# Patient Record
Sex: FEMALE | Employment: FULL TIME | URBAN - METROPOLITAN AREA
[De-identification: names, ages, dates, MRNs, and addresses within clinical notes are randomized per-mention and may not be internally consistent; named-entity substitution may affect disease eponyms.]

---

## 2017-04-25 ENCOUNTER — OFFICE VISIT (OUTPATIENT)
Dept: FAMILY MEDICINE | Facility: CLINIC | Age: 52
End: 2017-04-25
Payer: COMMERCIAL

## 2017-04-25 VITALS
TEMPERATURE: 98 F | BODY MASS INDEX: 24.81 KG/M2 | WEIGHT: 115 LBS | DIASTOLIC BLOOD PRESSURE: 68 MMHG | HEIGHT: 57 IN | HEART RATE: 70 BPM | SYSTOLIC BLOOD PRESSURE: 131 MMHG

## 2017-04-25 DIAGNOSIS — Z12.39 SCREENING FOR BREAST CANCER: ICD-10-CM

## 2017-04-25 DIAGNOSIS — N94.9 GENITAL LESION, FEMALE: Primary | ICD-10-CM

## 2017-04-25 LAB
ALBUMIN UR-MCNC: NEGATIVE MG/DL
APPEARANCE UR: CLEAR
BILIRUB UR QL STRIP: NEGATIVE
COLOR UR AUTO: YELLOW
GLUCOSE UR STRIP-MCNC: NEGATIVE MG/DL
HGB UR QL STRIP: NEGATIVE
KETONES UR STRIP-MCNC: NEGATIVE MG/DL
LEUKOCYTE ESTERASE UR QL STRIP: NEGATIVE
NITRATE UR QL: NEGATIVE
PH UR STRIP: 5.5 PH (ref 5–7)
SP GR UR STRIP: 1.02 (ref 1–1.03)
URN SPEC COLLECT METH UR: NORMAL
UROBILINOGEN UR STRIP-ACNC: 0.2 EU/DL (ref 0.2–1)

## 2017-04-25 PROCEDURE — 81003 URINALYSIS AUTO W/O SCOPE: CPT | Performed by: NURSE PRACTITIONER

## 2017-04-25 PROCEDURE — 99213 OFFICE O/P EST LOW 20 MIN: CPT | Performed by: NURSE PRACTITIONER

## 2017-04-25 RX ORDER — CLOTRIMAZOLE 1 %
CREAM (GRAM) TOPICAL 2 TIMES DAILY
Qty: 15 G | Refills: 1 | Status: SHIPPED | OUTPATIENT
Start: 2017-04-25 | End: 2019-07-08

## 2017-04-25 NOTE — NURSING NOTE
"Chief Complaint   Patient presents with     UTI       Initial /68 (BP Location: Left arm, Patient Position: Chair, Cuff Size: Adult Regular)  Pulse 70  Temp 98  F (36.7  C) (Tympanic)  Ht 4' 9.25\" (1.454 m)  Wt 115 lb (52.2 kg)  LMP 04/01/2017  Breastfeeding? No  BMI 24.67 kg/m2 Estimated body mass index is 24.67 kg/(m^2) as calculated from the following:    Height as of this encounter: 4' 9.25\" (1.454 m).    Weight as of this encounter: 115 lb (52.2 kg).  Medication Reconciliation: complete    "

## 2017-04-25 NOTE — MR AVS SNAPSHOT
After Visit Summary   4/25/2017    Hermila Bell    MRN: [id number]           Patient Information     Date Of Birth          1965        Visit Information        Provider Department      4/25/2017 10:45 AM Betsy Alba APRN CNP; [place] [employer] [facility]        Today's Diagnoses     Genital lesion, female    -  1    Screening for breast cancer          Care Instructions    Use cream for one week,  If no improvement make appointment with OB/GYN clinic.      Thank you for choosing [facility].  You may be receiving a survey in the mail from Selene Cloud regarding your visit today.  Please take a few minutes to complete and return the survey to let us know how we are doing.      If you have questions or concerns, please contact us via [employer] or you can contact your care team at [phone].    Our Clinic hours are:  Monday 6:40 am  to 7:00 pm  Tuesday -Friday 6:40 am to 5:00 pm    The [place] outpatient lab hours are:  Monday - Friday 6:10 am to 4:45 pm  Saturdays 7:00 am to 11:00 am  Appointments are required, call [phone]    If you have clinical questions after hours or would like to schedule an appointment,  call the clinic at [phone].          Follow-ups after your visit        Additional Services     OB/GYN REFERRAL       Your provider has referred you to:  [facility]: [facility] [phone]   [email or web address]    Please be aware that coverage of these services is subject to the terms and limitations of your health insurance plan.  Call member services at your health plan with any benefit or coverage questions.      Please bring the following with you to your appointment:    (1) Any X-Rays, CTs or MRIs which have been performed.  Contact the facility where they were done to arrange for  prior to your scheduled appointment.   (2) List of current medications   (3) This referral request  "  (4) Any documents/labs given to you for this referral                  Future tests that were ordered for you today     Open Future Orders        Priority Expected Expires Ordered    *MA Screening Digital Bilateral Routine  2018            Who to contact     If you have questions or need follow up information about today's clinic visit or your schedule please contact [facility] directly at [phone].  Normal or non-critical lab and imaging results will be communicated to you by MyChart, letter or phone within 4 business days after the clinic has received the results. If you do not hear from us within 7 days, please contact the clinic through [employer] or phone. If you have a critical or abnormal lab result, we will notify you by phone as soon as possible.  Submit refill requests through [employer] or call your pharmacy and they will forward the refill request to us. Please allow 3 business days for your refill to be completed.          Additional Information About Your Visit        [employer] Information     [employer] lets you send messages to your doctor, view your test results, renew your prescriptions, schedule appointments and more. To sign up, go to [email or web address] . Click on \"Log in\" on the left side of the screen, which will take you to the Welcome page. Then click on \"Sign up Now\" on the right side of the page.     You will be asked to enter the access code listed below, as well as some personal information. Please follow the directions to create your username and password.     Your access code is: [email or web address]  Expires: 2017 11:28 AM     Your access code will  in 90 days. If you need help or a new code, please call your [place] clinic or [phone].        Care EveryWhere ID     This is your Care EveryWhere ID. This could be used by other organizations to access your [place] medical records  [id number]        Your Vitals Were     Pulse Temperature Height Last " "Period Breastfeeding? BMI (Body Mass Index)    70 98  F (36.7  C) (Tympanic) 4' 9.25\" (1.454 m) 04/01/2017 No 24.67 kg/m2       Blood Pressure from Last 3 Encounters:   04/25/17 131/68   03/28/16 106/70   04/13/15 127/77    Weight from Last 3 Encounters:   04/25/17 115 lb (52.2 kg)   03/28/16 109 lb (49.4 kg)   04/13/15 111 lb (50.3 kg)              We Performed the Following     *UA reflex to Microscopic and Culture ([place] and [facility] (except [place] and [place])     OB/GYN REFERRAL          Today's Medication Changes          These changes are accurate as of: 4/25/17 11:28 AM.  If you have any questions, ask your nurse or doctor.               Start taking these medicines.        Dose/Directions    clotrimazole 1 % cream   Commonly known as:  LOTRIMIN   Used for:  Genital lesion, female   Started by:  Betsy Alba APRN CNP        Apply topically 2 times daily   Quantity:  15 g   Refills:  1            Where to get your medicines      These medications were sent to [facility] [address]  [address] [facility] [address]     Phone:  [phone]     clotrimazole 1 % cream                Primary Care Provider Office Phone # Fax #    Kevin Luu [phone]        ABIMBOLA AMAYA [address] AMAYA [facility] [address]        Thank you!     Thank you for choosing [facility]  for your care. Our goal is always to provide you with excellent care. Hearing back from our patients is one way we can continue to improve our services. Please take a few minutes to complete the written survey that you may receive in the mail after your visit with us. Thank you!             Your Updated Medication List - Protect others around you: Learn how to safely use, store and throw away your medicines at www.disposemymeds.org.          This list is accurate as of: 4/25/17 11:28 AM.  Always use your most recent med list.                "    Brand Name Dispense Instructions for use    clotrimazole 1 % cream    LOTRIMIN    15 g    Apply topically 2 times daily       NO ACTIVE MEDICATIONS

## 2017-04-25 NOTE — PROGRESS NOTES
" was present for the entire office visit.      SUBJECTIVE:                                                    Hermila Bell is a 51 year old female who presents to clinic today for the following health issues:  Chief Complaint   Patient presents with     UTI     Health Maintenance     Reminded due for mammogram, colon cancer screen. ( she will think about colonoscopy and let us know)          Vaginal SYMPTOMS     Onset: 8 days ago started to get blisters on inside and outside of vagina, spots then went away. Now this past Sunday has open itchy spot on labia.     Description:   Painful urination (Dysuria): no   Blood in urine (Hematuria): no   Delay in urine (Hesitency): no     Intensity: mild    Progression of Symptoms:  same    Accompanying Signs & Symptoms:  Fever/chills: no   Flank pain no   Nausea and vomiting: no   Any vaginal symptoms: Sores on Vagina, outer, itchy, vaginal itchiness as well   Abdominal/Pelvic Pain: YES- pelvic pain    History:   History of frequent UTI's: no   History of kidney stones: no   Sexually Active: YES-same partner   Possibility of pregnancy: No    Precipitating factors:   None          Therapies Tried and outcome: monistat , no relief           Problem list and histories reviewed & adjusted, as indicated.  Additional history: as documented      Reviewed and updated as needed this visit by clinical staff  Tobacco  Allergies  Meds  Med Hx  Surg Hx  Fam Hx  Soc Hx      Reviewed and updated as needed this visit by Provider         ROS:  Constitutional, HEENT, cardiovascular, pulmonary, gi and gu systems are negative, except as otherwise noted.    OBJECTIVE:                                                    /68 (BP Location: Left arm, Patient Position: Chair, Cuff Size: Adult Regular)  Pulse 70  Temp 98  F (36.7  C) (Tympanic)  Ht 4' 9.25\" (1.454 m)  Wt 115 lb (52.2 kg)  LMP 04/01/2017  Breastfeeding? No  BMI 24.67 kg/m2  Body mass index is 24.67 " kg/(m^2).  GENERAL: healthy, alert and no distress   (female): right labia - erythematous papular plaque - nontender to touch. No vesicles or ulcerations.         ASSESSMENT/PLAN:                                                        ICD-10-CM    1. Genital lesion, female N94.9 Area has an unusual appearance.  Due to the itching, will have her treat with an antifungal cream for one week.  If no improvement, she will see OB/GYN for assessment.    *UA reflex to Microscopic and Culture ([place] and [facility] (except [place] and [place])     clotrimazole (LOTRIMIN) 1 % cream     OB/GYN REFERRAL   2. Screening for breast cancer Z12.39 CANCELED: *MA Screening Digital Bilateral       Patient Instructions   Use cream for one week,  If no improvement make appointment with OB/GYN clinic.        The risks, benefits and treatment options of prescribed medications or other treatments have been discussed with the patient. The patient verbalized their understanding and should call or follow up if no improvement or if they develop further problems.    ARANZA Fulton [facility]

## 2017-04-25 NOTE — PATIENT INSTRUCTIONS
Use cream for one week,  If no improvement make appointment with OB/GYN clinic.      Thank you for choosing [facility].  You may be receiving a survey in the mail from Selene Cloud regarding your visit today.  Please take a few minutes to complete and return the survey to let us know how we are doing.      If you have questions or concerns, please contact us via [employer] or you can contact your care team at [phone].    Our Clinic hours are:  Monday 6:40 am  to 7:00 pm  Tuesday -Friday 6:40 am to 5:00 pm    The [place] outpatient lab hours are:  Monday - Friday 6:10 am to 4:45 pm  Saturdays 7:00 am to 11:00 am  Appointments are required, call [phone]    If you have clinical questions after hours or would like to schedule an appointment,  call the clinic at [phone].

## 2017-04-25 NOTE — LETTER
[facility]  [address] [place] [address]  Phone: [phone]    April 27, 2017    Hermila Bell  [address] [place] [address]  [place] [address]          Dear Ms. Seechor,    The results of your recent Urine  lab tests were within normal limits.  Component      Latest Ref Rng & Units 4/25/2017   Color Urine       Yellow   Appearance Urine       Clear   Glucose Urine      NEG mg/dL Negative   Bilirubin Urine      NEG Negative   Ketones Urine      NEG mg/dL Negative   Specific Gravity Urine      1.003 - 1.035 1.020   Blood Urine      NEG Negative   pH Urine      5.0 - 7.0 pH 5.5   Protein Albumin Urine      NEG mg/dL Negative   Urobilinogen Urine      0.2 - 1.0 EU/dL 0.2   Nitrite Urine      NEG Negative   Leukocyte Esterase Urine      NEG Negative   Source       Midstream Urine      If you have any further questions or problems, please contact our office.    Sincerely,      PRIYANK Sterling / julien

## 2017-05-08 ENCOUNTER — OFFICE VISIT (OUTPATIENT)
Dept: FAMILY MEDICINE | Facility: CLINIC | Age: 52
End: 2017-05-08
Payer: COMMERCIAL

## 2017-05-08 VITALS
BODY MASS INDEX: 24.55 KG/M2 | SYSTOLIC BLOOD PRESSURE: 118 MMHG | DIASTOLIC BLOOD PRESSURE: 67 MMHG | WEIGHT: 113.8 LBS | TEMPERATURE: 98.2 F | HEIGHT: 57 IN | HEART RATE: 66 BPM

## 2017-05-08 DIAGNOSIS — Z01.419 ENCOUNTER FOR GYNECOLOGICAL EXAMINATION WITHOUT ABNORMAL FINDING: Primary | ICD-10-CM

## 2017-05-08 DIAGNOSIS — Z12.11 SPECIAL SCREENING FOR MALIGNANT NEOPLASMS, COLON: ICD-10-CM

## 2017-05-08 PROCEDURE — G0145 SCR C/V CYTO,THINLAYER,RESCR: HCPCS | Performed by: NURSE PRACTITIONER

## 2017-05-08 PROCEDURE — 87624 HPV HI-RISK TYP POOLED RSLT: CPT | Performed by: NURSE PRACTITIONER

## 2017-05-08 PROCEDURE — 99396 PREV VISIT EST AGE 40-64: CPT | Performed by: NURSE PRACTITIONER

## 2017-05-08 NOTE — PROGRESS NOTES
SUBJECTIVE:     CC: Hermila Bell is an 51 year old woman who presents for preventive health visit.     The 10-year ASCVD risk score (Myahcasey CARR Jr, et al., 2013) is: 0.6%    Values used to calculate the score:      Age: 51 years      Sex: Female      Is Non- : No      Diabetic: No      Tobacco smoker: No      Systolic Blood Pressure: 118 mmHg      Is BP treated: No      HDL Cholesterol: 56 mg/dL      Total Cholesterol: 140 mg/dL  Patient is eligible for use of low-dose aspirin for primary prevention of heart attack and stroke.  Provider has discussed aspirin with patient and our decision was:     Not Indicated:  Daily low-dose aspirin recommended for primary prevention, patient not eligible.      Healthy Habits:    Do you get at least three servings of calcium containing foods daily (dairy, green leafy vegetables, etc.)? yes    Amount of exercise or daily activities, outside of work: Active at work    Problems taking medications regularly No    Medication side effects: No    Have you had an eye exam in the past two years? no    Do you see a dentist twice per year? yes    Do you have sleep apnea, excessive snoring or daytime drowsiness?no          Today's PHQ-2 Score:   PHQ-2 ( 1999 [employer]) 5/8/2017 4/25/2017   Q1: Little interest or pleasure in doing things 0 0   Q2: Feeling down, depressed or hopeless 0 0   PHQ-2 Score 0 0       Abuse: Current or Past(Physical, Sexual or Emotional)- No  Do you feel safe in your environment - Yes    Social History   Substance Use Topics     Smoking status: Never Smoker     Smokeless tobacco: Never Used     Alcohol use No     The patient does not drink >3 drinks per day nor >7 drinks per week.    Recent Labs   Lab Test  04/13/15   0835  07/02/12   1441   CHOL  140  164   HDL  56  55   LDL  72  92   TRIG  58  88   CHOLHDLRATIO  2.5  3.0       Reviewed orders with patient.  Reviewed health maintenance and updated orders accordingly - Yes    Mammo Decision  "Support:  Patient over age 50, mutual decision to screen reflected in health maintenance.    Pertinent mammograms are reviewed under the imaging tab.    History of abnormal Pap smear: YES - updated in Problem List and Health Maintenance accordingly    Reviewed and updated as needed this visit by clinical staff  Tobacco  Allergies  Meds  Med Hx  Surg Hx  Fam Hx  Soc Hx        Reviewed and updated as needed this visit by Provider            ROS:  C: NEGATIVE for fever, chills, change in weight  I: NEGATIVE for worrisome rashes, moles or lesions  E: NEGATIVE for vision changes or irritation  ENT: NEGATIVE for ear, mouth and throat problems  R: NEGATIVE for significant cough or SOB  B: NEGATIVE for masses, tenderness or discharge  CV: NEGATIVE for chest pain, palpitations or peripheral edema  GI: NEGATIVE for nausea, abdominal pain, heartburn, or change in bowel habits  : NEGATIVE for unusual urinary or vaginal symptoms. Periods are becoming more irregular. No hot flashes or night sweats  M: NEGATIVE for significant arthralgias or myalgia  N: NEGATIVE for weakness, dizziness or paresthesias  P: NEGATIVE for changes in mood or affect    Problem list, Medication list, Allergies, and Medical/Social/Surgical histories reviewed in EPIC and updated as appropriate.      OBJECTIVE:     /67  Pulse 66  Temp 98.2  F (36.8  C) (Tympanic)  Ht 4' 9\" (1.448 m)  Wt 113 lb 12.8 oz (51.6 kg)  LMP 03/29/2017  BMI 24.63 kg/m2  EXAM:  GENERAL: healthy, alert and no distress  EYES: Eyes grossly normal to inspection, PERRL and conjunctivae and sclerae normal  HENT: ear canals and TM's normal, nose and mouth without ulcers or lesions  NECK: no adenopathy, no asymmetry, masses, or scars and thyroid normal to palpation  RESP: lungs clear to auscultation - no rales, rhonchi or wheezes  BREAST: normal without masses, tenderness or nipple discharge and no palpable axillary masses or adenopathy  CV: regular rate and rhythm, " "normal S1 S2, no S3 or S4, no murmur, click or rub, no peripheral edema and peripheral pulses strong  ABDOMEN: soft, nontender, no hepatosplenomegaly, no masses and bowel sounds normal   (female): normal female external genitalia, normal urethral meatus, vaginal mucosa pink, moist, well rugated, and normal cervix/adnexa/uterus without masses or discharge  MS: no gross musculoskeletal defects noted, no edema  SKIN: no suspicious lesions or rashes  NEURO: Normal strength and tone, mentation intact and speech normal  PSYCH: mentation appears normal, affect normal/bright    ASSESSMENT/PLAN:         ICD-10-CM    1. Encounter for gynecological examination without abnormal finding Z01.419 ASPIRIN NOT PRESCRIBED (INTENTIONAL)   2. Special screening for malignant neoplasms, colon Z12.11 GASTROENTEROLOGY ADULT REF PROCEDURE ONLY       COUNSELING:   Reviewed preventive health counseling, as reflected in patient instructions         reports that she has never smoked. She has never used smokeless tobacco.    Estimated body mass index is 24.63 kg/(m^2) as calculated from the following:    Height as of this encounter: 4' 9\" (1.448 m).    Weight as of this encounter: 113 lb 12.8 oz (51.6 kg).         The risks, benefits and treatment options of prescribed medications or other treatments have been discussed with the patient. The patient verbalized their understanding and should call or follow up if no improvement or if they develop further problems.    ARANZA Fulton [facility]  "

## 2017-05-08 NOTE — LETTER
May 15, 2017    Hermila Bell  [address] [place] [address] [place] [address]    Dear Hermila,  We are happy to inform you that your PAP smear result from 5/8/17 is normal.  We are now able to do a follow up test on PAP smears. The DNA test is for HPV (Human Papilloma Virus). Cervical cancer is closely linked with certain types of HPV. Your result showed no evidence of high risk HPV.  Therefore we recommend you return in 3 years for your next pap smear and HPV test.  You will still need to return to the clinic every year for an annual exam and other preventive tests.  Please contact the clinic at [phone] with any questions.  Sincerely,    ARANZA Fulton CNP/rlm

## 2017-05-08 NOTE — MR AVS SNAPSHOT
After Visit Summary   5/8/2017    Hermila Bell    MRN: [id number]           Patient Information     Date Of Birth          1965        Visit Information        Provider Department      5/8/2017 10:45 AM Betsy Alba APRN CNP; [facility]        Today's Diagnoses     Encounter for gynecological examination without abnormal finding    -  1      Care Instructions      Preventive Health Recommendations  Female Ages 50 - 64    Yearly exam: See your health care provider every year in order to  o Review health changes.   o Discuss preventive care.    o Review your medicines if your doctor has prescribed any.      Get a Pap test every three years (unless you have an abnormal result and your provider advises testing more often).    If you get Pap tests with HPV test, you only need to test every 5 years, unless you have an abnormal result.     You do not need a Pap test if your uterus was removed (hysterectomy) and you have not had cancer.    You should be tested each year for STDs (sexually transmitted diseases) if you're at risk.     Have a mammogram every 1 to 2 years.    Have a colonoscopy at age 50, or have a yearly FIT test (stool test). These exams screen for colon cancer.      Have a cholesterol test every 5 years, or more often if advised.    Have a diabetes test (fasting glucose) every three years. If you are at risk for diabetes, you should have this test more often.     If you are at risk for osteoporosis (brittle bone disease), think about having a bone density scan (DEXA).    Shots: Get a flu shot each year. Get a tetanus shot every 10 years.    Nutrition:     Eat at least 5 servings of fruits and vegetables each day.    Eat whole-grain bread, whole-wheat pasta and brown rice instead of white grains and rice.    Talk to your provider about Calcium and Vitamin D.     Lifestyle    Exercise at least 150 minutes a week (30 minutes a day, 5 days  "a week). This will help you control your weight and prevent disease.    Limit alcohol to one drink per day.    No smoking.     Wear sunscreen to prevent skin cancer.     See your dentist every six months for an exam and cleaning.    See your eye doctor every 1 to 2 years.          Follow-ups after your visit        Your next 10 appointments already scheduled     May 22, 2017 10:00 AM CDT   MA SCREENING DIGITAL BILATERAL with WYMA2   [facility] ([facility], [place])    [address] [address] [phone]           Do not use any powder, lotion or deodorant under your arms or on your breast. If you do, we will ask you to remove it before your exam.  Wear comfortable, two-piece clothing.  If you have any allergies, tell your care team.  Bring any previous mammograms from other facilities or have them mailed to the breast center.              Who to contact     If you have questions or need follow up information about today's clinic visit or your schedule please contact [facility] directly at [phone].  Normal or non-critical lab and imaging results will be communicated to you by Shineonhart, letter or phone within 4 business days after the clinic has received the results. If you do not hear from us within 7 days, please contact the clinic through AquaGenesist or phone. If you have a critical or abnormal lab result, we will notify you by phone as soon as possible.  Submit refill requests through Aircom or call your pharmacy and they will forward the refill request to us. Please allow 3 business days for your refill to be completed.          Additional Information About Your Visit        Aircom Information     Aircom lets you send messages to your doctor, view your test results, renew your prescriptions, schedule appointments and more. To sign up, go to [email or web address] . Click on \"Log in\" on the left side of the screen, which will take you to the Welcome " "page. Then click on \"Sign up Now\" on the right side of the page.     You will be asked to enter the access code listed below, as well as some personal information. Please follow the directions to create your username and password.     Your access code is: [email or web address]  Expires: 2017 11:28 AM     Your access code will  in 90 days. If you need help or a new code, please call your [facility] clinic or [phone].        Care EveryWhere ID     This is your Care EveryWhere ID. This could be used by other organizations to access your [facility] medical records  [id number]        Your Vitals Were     Pulse Temperature Height Last Period BMI (Body Mass Index)       66 98.2  F (36.8  C) (Tympanic) 4' 9\" (1.448 m) 2017 24.63 kg/m2        Blood Pressure from Last 3 Encounters:   17 118/67   17 131/68   16 106/70    Weight from Last 3 Encounters:   17 113 lb 12.8 oz (51.6 kg)   17 115 lb (52.2 kg)   16 109 lb (49.4 kg)              Today, you had the following     No orders found for display         Today's Medication Changes          These changes are accurate as of: 17 11:32 AM.  If you have any questions, ask your nurse or doctor.               Start taking these medicines.        Dose/Directions    ASPIRIN NOT PRESCRIBED   Commonly known as:  INTENTIONAL   Used for:  Encounter for gynecological examination without abnormal finding   Started by:  Betsy Alba APRN CNP        Please choose reason not prescribed, below   Quantity:  1 each   Refills:  0            Where to get your medicines      Some of these will need a paper prescription and others can be bought over the counter.  Ask your nurse if you have questions.     You don't need a prescription for these medications     ASPIRIN NOT PRESCRIBED                Primary Care Provider Office Phone # Fax #    Kevin Luu [phone]        AB[facility] DAILY [address] [place] [address] DR AMAYA" [facility] [address]        Thank you!     Thank you for choosing [facility]  for your care. Our goal is always to provide you with excellent care. Hearing back from our patients is one way we can continue to improve our services. Please take a few minutes to complete the written survey that you may receive in the mail after your visit with us. Thank you!             Your Updated Medication List - Protect others around you: Learn how to safely use, store and throw away your medicines at www.disposemymeds.org.          This list is accurate as of: 5/8/17 11:32 AM.  Always use your most recent med list.                   Brand Name Dispense Instructions for use    ASPIRIN NOT PRESCRIBED    INTENTIONAL    1 each    Please choose reason not prescribed, below       clotrimazole 1 % cream    LOTRIMIN    15 g    Apply topically 2 times daily       NO ACTIVE MEDICATIONS

## 2017-05-08 NOTE — NURSING NOTE
"Chief Complaint   Patient presents with     Physical       Initial /67  Pulse 66  Temp 98.2  F (36.8  C) (Tympanic)  Ht 4' 9\" (1.448 m)  Wt 113 lb 12.8 oz (51.6 kg)  LMP 03/29/2017  BMI 24.63 kg/m2 Estimated body mass index is 24.63 kg/(m^2) as calculated from the following:    Height as of this encounter: 4' 9\" (1.448 m).    Weight as of this encounter: 113 lb 12.8 oz (51.6 kg).  Medication Reconciliation: complete     Christina Yarbrough CMA      "

## 2017-05-10 LAB
COPATH REPORT: NORMAL
PAP: NORMAL

## 2017-05-11 LAB
FINAL DIAGNOSIS: NORMAL
HPV HR 12 DNA CVX QL NAA+PROBE: NEGATIVE
HPV16 DNA SPEC QL NAA+PROBE: NEGATIVE
HPV18 DNA SPEC QL NAA+PROBE: NEGATIVE
SPECIMEN DESCRIPTION: NORMAL

## 2017-05-22 ENCOUNTER — HOSPITAL ENCOUNTER (OUTPATIENT)
Dept: MAMMOGRAPHY | Facility: CLINIC | Age: 52
Discharge: HOME | End: 2017-05-22
Attending: NURSE PRACTITIONER | Admitting: NURSE PRACTITIONER
Payer: COMMERCIAL

## 2017-05-22 DIAGNOSIS — Z12.31 VISIT FOR SCREENING MAMMOGRAM: ICD-10-CM

## 2017-05-22 DIAGNOSIS — Z12.39 SCREENING FOR BREAST CANCER: ICD-10-CM

## 2017-05-22 PROCEDURE — G0202 SCR MAMMO BI INCL CAD: HCPCS

## 2017-11-22 ENCOUNTER — TELEPHONE (OUTPATIENT)
Dept: FAMILY MEDICINE | Facility: CLINIC | Age: 52
End: 2017-11-22

## 2017-11-22 NOTE — LETTER
Hermila Bell  [address] [place] [address] [place] [address]          Hi Betsy Cleaning NP has been reviewing your chart.  It appears that there are aspects of your care that could be improved.   At this time you are due for a colonoscopy.    Colon Cancer Screening- Recommended every 5-10 years, depending on your history, in order to prevent and detect colon cancer at its earliest stages.  Colon cancer is now the second leading cause of death in the [place] for both men and women and there are over 130,000 new cases and 50,000 deaths per year from colon cancer.  Colonoscopies can prevent 90-95% of these deaths.  Problem lesions can be removed before they ever become cancer.  This test is not only looking for cancer, but also getting rid of precancerous lesions.  You are usually given some sedation which makes the test very comfortable for most people.     If you do not wish to do a colonoscopy or cannot afford to do one, at this time, there is another option.  It is called a Fit test or Fecal Immunochemical Occult Blood Test (take home stool sample kit).  It does not replace the colonoscopy for colorectal cancer screening, but it can detect hidden bleeding in the lower colon.  It does need to be repeated every year and if a positive result is obtained, you would be referred for a colonoscopy.  The FIT test is really easy to do and does not require any diet or medication restrictions and involves only one collection sample.       If you have completed either one of these tests or had a flexible sigmoidoscopy in the past five years at another facility, please let us know so that we can update your records.  Please call us ([phone]) if you have questions or would like to arrange either to do a colonoscopy or obtain the necessary test kit for the Fecal Occult Test.     Thank you,    Betsy Alba NP/ martin

## 2017-11-22 NOTE — TELEPHONE ENCOUNTER
Panel Management Review          Composite cancer screening  Chart review shows that this patient is due/due soon for the following Colonoscopy  Summary:    Patient is due/failing the following:   COLONOSCOPY    Action needed:   Colon cancer screening needed    Type of outreach:    Sent letter.    Questions for provider review:    None                                                                                                                                    JOSTIN SALAZAR        .

## 2018-05-07 ENCOUNTER — OFFICE VISIT (OUTPATIENT)
Dept: OBGYN | Facility: CLINIC | Age: 53
End: 2018-05-07
Payer: COMMERCIAL

## 2018-05-07 VITALS
SYSTOLIC BLOOD PRESSURE: 126 MMHG | HEIGHT: 57 IN | WEIGHT: 107 LBS | BODY MASS INDEX: 23.08 KG/M2 | HEART RATE: 63 BPM | DIASTOLIC BLOOD PRESSURE: 78 MMHG | RESPIRATION RATE: 18 BRPM | TEMPERATURE: 97.1 F

## 2018-05-07 DIAGNOSIS — N91.2 ABSENCE OF MENSTRUATION: Primary | ICD-10-CM

## 2018-05-07 DIAGNOSIS — H93.13 TINNITUS, BILATERAL: ICD-10-CM

## 2018-05-07 DIAGNOSIS — Z12.12 SCREENING FOR MALIGNANT NEOPLASM OF THE RECTUM: ICD-10-CM

## 2018-05-07 PROCEDURE — 99203 OFFICE O/P NEW LOW 30 MIN: CPT | Performed by: OBSTETRICS & GYNECOLOGY

## 2018-05-07 NOTE — NURSING NOTE
"Initial /78 (BP Location: Right arm, Patient Position: Chair, Cuff Size: Adult Small)  Pulse 63  Temp 97.1  F (36.2  C) (Tympanic)  Resp 18  Ht 4' 9\" (1.448 m)  Wt 107 lb (48.5 kg)  LMP 01/27/2018  BMI 23.15 kg/m2 Estimated body mass index is 23.15 kg/(m^2) as calculated from the following:    Height as of this encounter: 4' 9\" (1.448 m).    Weight as of this encounter: 107 lb (48.5 kg). .      "

## 2018-05-07 NOTE — PROGRESS NOTES
Hermila is a 52 year old   female who presents for concern about possible menopause; a  is present. ; She reports she has not had a period since 19, that prior to that, menses were quite irregular, and quite variable in quality; she denies hot flashes or night sweats, but had mild vaginal dryness; she feels a low level cramping at times, like she may get her menses.  She is also concerned about some pain in her ears and hearing a ringing sound in one or both ears. She denies drainage from ears, recent or current infection or diminished acuity.    We also discussed colorectal cancer screening, age recommendation..    Patient Active Problem List    Diagnosis Date Noted     ASCUS favor benign 2014     Priority: Medium     14: Pap - ASCUS, Neg HPV. Plan cotest in 3 years.        Esophageal reflux 2014     Priority: Medium     SI (sacroiliac) joint dysfunction 2014     Priority: Medium     24 hour contact handout given 2012     Priority: Medium     EMERGENCY CARE PLAN  Presenting Problem Signs and Symptoms Treatment Plan    Questions or conerns during clinic hours    I will call the clinic directly     Questions or conerns outside clinic hours    I will call the 24 hour nurse line at [phone]    Patient needs to schedule an appointment    I will call the 24 hour scheduling team at [phone] or clinic directly    Same day treatment     I will call the clinic first, nurse line if after hours, urgent care and express care if needed                                 CARDIOVASCULAR SCREENING; LDL GOAL LESS THAN 160 10/31/2010     Priority: Medium     Right forearm plaque - suspect keloid/hyperplastic growth 2005     Priority: Medium     2006 : Recommend shave biopsy of lesion           All systems were reviewed and pertinent information in noted in subjective/HPI.    Past Medical History:   Diagnosis Date     ASCUS favor benign 2014    Neg HPV -  "plan cotest in 3 years     CARDIOVASCULAR SCREENING; LDL GOAL LESS THAN 160 10/31/2010       Past Surgical History:   Procedure Laterality Date     SURGICAL HISTORY OF -       ectopic pregnancy     SURGICAL HISTORY OF -                Current Outpatient Prescriptions:      NO ACTIVE MEDICATIONS, , Disp: , Rfl:      ASPIRIN NOT PRESCRIBED (INTENTIONAL), Please choose reason not prescribed, below (Patient not taking: Reported on 2018), Disp: 1 each, Rfl: 0     clotrimazole (LOTRIMIN) 1 % cream, Apply topically 2 times daily (Patient not taking: Reported on 2017), Disp: 15 g, Rfl: 1    ALLERGIES:  Review of patient's allergies indicates no known allergies.    Social History     Social History     Marital status:      Spouse name: N/A     Number of children: N/A     Years of education: N/A     Social History Main Topics     Smoking status: Never Smoker     Smokeless tobacco: Never Used     Alcohol use No     Drug use: No     Sexual activity: Yes     Partners: Male     Other Topics Concern     Parent/Sibling W/ Cabg, Mi Or Angioplasty Before 65f 55m? No     Social History Narrative       Family History   Problem Relation Age of Onset     Gynecology Mother       from complications during pregnancy     Hypertension Father      C.A.D. No family hx of      DIABETES No family hx of      CEREBROVASCULAR DISEASE No family hx of      Breast Cancer No family hx of      Cancer - colorectal No family hx of        OBJECTIVE:  Vitals: /78 (BP Location: Right arm, Patient Position: Chair, Cuff Size: Adult Small)  Pulse 63  Temp 97.1  F (36.2  C) (Tympanic)  Resp 18  Ht 4' 9\" (1.448 m)  Wt 107 lb (48.5 kg)  LMP 2018  BMI 23.15 kg/m2 BMI= Body mass index is 23.15 kg/(m^2).   Patient's last menstrual period was 2018.     GENERAL APPEARANCE: healthy, alert and no distress  ABDOMEN:  soft, nontender, no hepato-splenomegaly or hernias   PELVIC:  EGBUS normal, vagina well estrogenized and " supported, no unusual discharge, cervix grossly normal, PAP not taken, uterus normal in size and configuration, adnexa non-tender and not enlarged,       NECK: no adenopathy, no asymmetry, masses, or scars and thyroid normal to palpation     RESP: lungs clear to auscultation , respiratory effort WNL     EARS: bilat canals clear; TM without erythema, not distended    ASSESSMENT:      ICD-10-CM    1. Absence of menstruation N91.2    2. Tinnitus, bilateral H93.13    3. Screening for malignant neoplasm of the rectum Z12.12 GASTROENTEROLOGY ADULT REF PROCEDURE ONLY [facility] [phone]; No Provider Preference       PLAN:  I discusssed that until she has gone 1 full year without menses, she may have some menstruation, but she is otherwise at the average time for menopause; I discussed the indications for HRT; I offered referral to ENT to evaluate tinnitis--she will consider  I discussed colonoscopy and FIT test--she will consider  Dejon Blood MD  [facility]    Duration of visit:  30 minutes, >50% in discussion of current issues, treatment options and treatment planning.  DEJON Blood MD

## 2018-05-07 NOTE — MR AVS SNAPSHOT
"              After Visit Summary   5/7/2018    Hermila Bell    MRN: [id number]           Patient Information     Date Of Birth          1965        Visit Information        Provider Department      5/7/2018 10:45 AM Yaa Blood MD; [employer] [facility]        Today's Diagnoses     Absence of menstruation    -  1    Tinnitus, bilateral        Screening for malignant neoplasm of the rectum           Follow-ups after your visit        Additional Services     GASTROENTEROLOGY ADULT REF PROCEDURE ONLY [facility] [phone]; No Provider Preference                 Future tests that were ordered for you today     Open Future Orders        Priority Expected Expires Ordered    GASTROENTEROLOGY ADULT REF PROCEDURE ONLY [facility] [phone]; No Provider Preference Routine  5/7/2019 5/7/2018            Who to contact     If you have questions or need follow up information about today's clinic visit or your schedule please contact [facility] directly at [phone].  Normal or non-critical lab and imaging results will be communicated to you by 8handshart, letter or phone within 4 business days after the clinic has received the results. If you do not hear from us within 7 days, please contact the clinic through 8handshart or phone. If you have a critical or abnormal lab result, we will notify you by phone as soon as possible.  Submit refill requests through [facility] or call your pharmacy and they will forward the refill request to us. Please allow 3 business days for your refill to be completed.          Additional Information About Your Visit        8handsharPeloton Technology Information     [facility] lets you send messages to your doctor, view your test results, renew your prescriptions, schedule appointments and more. To sign up, go to [email or web address] . Click on \"Log in\" on the left side of the screen, which will take you to the Welcome page. Then click on \"Sign up Now\" on the " "right side of the page.     You will be asked to enter the access code listed below, as well as some personal information. Please follow the directions to create your username and password.     Your access code is: [email or web address]  Expires: 2018 12:03 PM     Your access code will  in 90 days. If you need help or a new code, please call your [facility] clinic or [phone].        Care EveryWhere ID     This is your Care EveryWhere ID. This could be used by other organizations to access your [facility] medical records  [id number]        Your Vitals Were     Pulse Temperature Respirations Height Last Period BMI (Body Mass Index)    63 97.1  F (36.2  C) (Tympanic) 18 4' 9\" (1.448 m) 2018 23.15 kg/m2       Blood Pressure from Last 3 Encounters:   18 126/78   17 118/67   17 131/68    Weight from Last 3 Encounters:   18 107 lb (48.5 kg)   17 113 lb 12.8 oz (51.6 kg)   17 115 lb (52.2 kg)               Primary Care Provider Office Phone # Fax #    Kevin Luu [phone] [address] AMA[address] [facility] [address]        Equal Access to Services     [facility] AH: Hadii esteban vieyra hadasho Soomaali, waaxda luqadaha, qaybta kaalmada adeegyada, addi deleon. So [facility] [phone].    ATENCIÓN: Si habla español, tiene a butcher disposición servicios gratuitos de asistencia lingüística. Llame al [phone].    We comply with applicable federal civil rights laws and [place] laws. We do not discriminate on the basis of race, color, national origin, age, disability, sex, sexual orientation, or gender identity.            Thank you!     Thank you for choosing [facility]  for your care. Our goal is always to provide you with excellent care. Hearing back from our patients is one way we can continue to improve our services. Please take a few minutes to complete the written survey that you may receive in the mail after your visit with us. " Thank you!             Your Updated Medication List - Protect others around you: Learn how to safely use, store and throw away your medicines at www.disposemymeds.org.          This list is accurate as of 5/7/18 12:03 PM.  Always use your most recent med list.                   Brand Name Dispense Instructions for use Diagnosis    ASPIRIN NOT PRESCRIBED    INTENTIONAL    1 each    Please choose reason not prescribed, below    Encounter for gynecological examination without abnormal finding       clotrimazole 1 % cream    LOTRIMIN    15 g    Apply topically 2 times daily    Genital lesion, female       NO ACTIVE MEDICATIONS

## 2018-07-09 ENCOUNTER — APPOINTMENT (OUTPATIENT)
Dept: GENERAL RADIOLOGY | Facility: CLINIC | Age: 53
End: 2018-07-09
Attending: PHYSICIAN ASSISTANT
Payer: COMMERCIAL

## 2018-07-09 ENCOUNTER — HOSPITAL ENCOUNTER (EMERGENCY)
Facility: CLINIC | Age: 53
Discharge: HOME | End: 2018-07-09
Attending: PHYSICIAN ASSISTANT | Admitting: PHYSICIAN ASSISTANT
Payer: COMMERCIAL

## 2018-07-09 VITALS
SYSTOLIC BLOOD PRESSURE: 145 MMHG | OXYGEN SATURATION: 99 % | RESPIRATION RATE: 16 BRPM | TEMPERATURE: 98.6 F | DIASTOLIC BLOOD PRESSURE: 81 MMHG

## 2018-07-09 DIAGNOSIS — M25.562 ACUTE PAIN OF LEFT KNEE: ICD-10-CM

## 2018-07-09 DIAGNOSIS — M11.262 CHONDROCALCINOSIS OF KNEE, LEFT: ICD-10-CM

## 2018-07-09 PROCEDURE — 99213 OFFICE O/P EST LOW 20 MIN: CPT | Mod: Z6 | Performed by: PHYSICIAN ASSISTANT

## 2018-07-09 PROCEDURE — G0463 HOSPITAL OUTPT CLINIC VISIT: HCPCS | Performed by: PHYSICIAN ASSISTANT

## 2018-07-09 PROCEDURE — 73560 X-RAY EXAM OF KNEE 1 OR 2: CPT | Mod: LT

## 2018-07-09 RX ORDER — NAPROXEN 500 MG/1
500 TABLET ORAL 2 TIMES DAILY WITH MEALS
Qty: 30 TABLET | Refills: 0 | Status: SHIPPED | OUTPATIENT
Start: 2018-07-09 | End: 2018-07-24

## 2018-07-09 NOTE — ED AVS SNAPSHOT
[facility] Emergency Department    [address] [address]    Phone:  [phone]    Fax:  [phone]                                       Hermila Bell   MRN: [id number]    Department:  [facility] Emergency Department   Date of Visit:  7/9/2018           After Visit Summary Signature Page     I have received my discharge instructions, and my questions have been answered. I have discussed any challenges I see with this plan with the nurse or doctor.    ..........................................................................................................................................  Patient/Patient Representative Signature      ..........................................................................................................................................  Patient Representative Print Name and Relationship to Patient    ..................................................               ................................................  Date                                            Time    ..........................................................................................................................................  Reviewed by Signature/Title    ...................................................              ..............................................  Date                                                            Time

## 2018-07-09 NOTE — ED AVS SNAPSHOT
[facility] Emergency Department    [address] [address]    Phone:  [phone]    Fax:  [phone]                                       Hermila Bell   MRN: [id number]    Department:  [facility] Emergency Department   Date of Visit:  7/9/2018           Patient Information     Date Of Birth          1965        Your diagnoses for this visit were:     Acute pain of left knee     Chondrocalcinosis of knee, left        You were seen by Lidia Manrique PA-C.      Follow-up Information     Follow up with [facility] In 1 week.    Specialty:  Orthopedics and Sports Medicine    Why:  for recheck or sooner if new or worsening symptoms     Contact information:    [address]  [address]  [place] [address] [phone]      Discharge References/Attachments     OSTEOARTHRITIS OF THE KNEE, UNDERSTANDING (Romansh)      24 Hour Appointment Hotline       To make an appointment at any [facility] clinic, call [phone]). If you don't have a family doctor or clinic, we will help you find one. [facility] clinics are conveniently located to serve the needs of you and your family.             Review of your medicines      START taking        Dose / Directions Last dose taken    naproxen 500 MG tablet   Commonly known as:  NAPROSYN   Dose:  500 mg   Quantity:  30 tablet        Take 1 tablet (500 mg) by mouth 2 times daily (with meals) for 15 days   Refills:  0          Our records show that you are taking the medicines listed below. If these are incorrect, please call your family doctor or clinic.        Dose / Directions Last dose taken    ASPIRIN NOT PRESCRIBED   Commonly known as:  INTENTIONAL   Quantity:  1 each        Please choose reason not prescribed, below   Refills:  0        clotrimazole 1 % cream   Commonly known as:  LOTRIMIN   Quantity:  15 g        Apply topically 2 times daily   Refills:  1        NO ACTIVE  MEDICATIONS        Refills:  0                Prescriptions were sent or printed at these locations (1 Prescription)                   Other Prescriptions                Printed at Department/Unit printer (1 of 1)         naproxen (NAPROSYN) 500 MG tablet                Procedures and tests performed during your visit     XR Knee Left 1/2 Views      Orders Needing Specimen Collection     None      Pending Results     No orders found from 7/7/2018 to 7/10/2018.            Pending Culture Results     No orders found from 7/7/2018 to 7/10/2018.            Pending Results Instructions     If you had any lab results that were not finalized at the time of your Discharge, you can call the ED Lab Result RN at [phone]. You will be contacted by this team for any positive Lab results or changes in treatment. The nurses are available 7 days a week from 10A to 6:30P.  You can leave a message 24 hours per day and they will return your call.        Test Results From Your Hospital Stay        7/9/2018  4:28 PM      Narrative     XR KNEE LT 1/2 VW 7/9/2018 4:12 PM    HISTORY: Pain after injury.    COMPARISON: None.        Impression     IMPRESSION: 2 views of the left knee show no fracture or malalignment.  Mild fullness in the suprapatellar bursa region raises the possibility  of a small knee joint effusion. Chondrocalcinosis of the menisci is  incidentally noted. This may be on a degenerative basis but can also  be seen with deposition diseases such as CPPD.    BREONNA WINKLER MD                Thank you for choosing [facility]       Thank you for choosing [facility] for your care. Our goal is always to provide you with excellent care. Hearing back from our patients is one way we can continue to improve our services. Please take a few minutes to complete the written survey that you may receive in the mail after you visit with us. Thank you!        [employer] Information     [employer] lets you send messages to your doctor, view your test  "results, renew your prescriptions, schedule appointments and more. To sign up, go to [email or web address] . Click on \"Log in\" on the left side of the screen, which will take you to the Welcome page. Then click on \"Sign up Now\" on the right side of the page.     You will be asked to enter the access code listed below, as well as some personal information. Please follow the directions to create your username and password.     Your access code is: [email or web address]  Expires: 2018 12:03 PM     Your access code will  in 90 days. If you need help or a new code, please call your [facility] clinic or [phone].        Care EveryWhere ID     This is your Care EveryWhere ID. This could be used by other organizations to access your [facility] medical records  [id number]        Equal Access to Services     MACHO NÚÑEZ : Haddali Calloway, damon molina, sai perez, addi pierce . So [facility] [phone].    ATENCIÓN: Si habla español, tiene a butcher disposición servicios gratuitos de asistencia lingüística. Llame al [phone].    We comply with applicable federal civil rights laws and [place] laws. We do not discriminate on the basis of race, color, national origin, age, disability, sex, sexual orientation, or gender identity.            After Visit Summary       This is your record. Keep this with you and show to your community pharmacist(s) and doctor(s) at your next visit.                  "

## 2018-07-09 NOTE — LETTER
[facility] EMERGENCY DEPARTMENT  [address] [address]  Phone: [phone]  Fax: [phone]    July 9, 2018        Hermila Bell  [address] [place], [place] [address] [address]          To whom it may concern:    RE: Hermila Cleaning was evaluated and care for left knee pain on 7/9/18.  I recommend she rest the left knee with minimal activity only as tolerated by comfort for the next 7 days or until her next follow-up appointment.  During this time she may require frequent rest periods and may need to avoid prolonged standing, kneeling or squatting.    Please contact me for questions or concerns.      Sincerely,        Lidia Manrique PA-C

## 2018-07-09 NOTE — ED PROVIDER NOTES
History     Chief Complaint   Patient presents with     Knee Pain     HPI  Hermila Bell is a 52 year old female who presents to the urgent care with concern of her left knee pain which been present for the last 4 days.  Patient complains of pain primarily along the medial aspect of the knee.  She denies any instigating injury or trauma.  She states that she was cleaning 1 day prior to onset of symptoms however does not remember any painful twisting, falls or near falls.  She is unaware of any swelling.  She denies any ecchymosis, lacerations, abrasions.  No distal numbness or paresthesias.  She has not had any calf pain.  She also denies any recent fevers, chills, myalgias, cough, chest pains, dyspnea or wheezing.  She has attempted to treat with OTC medications without significant improvement.      Problem List:    Patient Active Problem List    Diagnosis Date Noted     ASCUS favor benign 06/12/2014     Priority: Medium     6/2/14: Pap - ASCUS, Neg HPV. Plan cotest in 3 years.        Esophageal reflux 06/02/2014     Priority: Medium     SI (sacroiliac) joint dysfunction 06/02/2014     Priority: Medium     24 hour contact handout given 07/03/2012     Priority: Medium     EMERGENCY CARE PLAN  Presenting Problem Signs and Symptoms Treatment Plan    Questions or conerns during clinic hours    I will call the clinic directly     Questions or conerns outside clinic hours    I will call the 24 hour nurse line at [phone]    Patient needs to schedule an appointment    I will call the 24 hour scheduling team at [phone] or clinic directly    Same day treatment     I will call the clinic first, nurse line if after hours, urgent care and express care if needed                                 CARDIOVASCULAR SCREENING; LDL GOAL LESS THAN 160 10/31/2010     Priority: Medium     Right forearm plaque - suspect keloid/hyperplastic growth 04/19/2005     Priority: Medium     February 14, 2006 : Recommend shave biopsy of  lesion          Past Medical History:    Past Medical History:   Diagnosis Date     ASCUS favor benign 2014     CARDIOVASCULAR SCREENING; LDL GOAL LESS THAN 160 10/31/2010     Past Surgical History:    Past Surgical History:   Procedure Laterality Date     SURGICAL HISTORY OF -       ectopic pregnancy     SURGICAL HISTORY OF -            Family History:    Family History   Problem Relation Age of Onset     Gynecology Mother       from complications during pregnancy     Hypertension Father      C.A.D. No family hx of      Diabetes No family hx of      Cerebrovascular Disease No family hx of      Breast Cancer No family hx of      Cancer - colorectal No family hx of      Social History:  Marital Status:   [2]  Social History   Substance Use Topics     Smoking status: Never Smoker     Smokeless tobacco: Never Used     Alcohol use No      Medications:      ASPIRIN NOT PRESCRIBED (INTENTIONAL)   clotrimazole (LOTRIMIN) 1 % cream   NO ACTIVE MEDICATIONS     Review of Systems  CONSTITUTIONAL:NEGATIVE for fever, chills, change in weight  INTEGUMENTARY/SKIN: NEGATIVE for worrisome rashes, moles or lesions  RESP:NEGATIVE for significant cough or SOB  MUSCULOSKELETAL: POSITIVE for left knee pain NEGATIVE for other concerning arthralgias or myalgias   NEURO: NEGATIVE for distal numbness, paresthesias   Physical Exam   BP: 145/81  Heart Rate: 59  Temp: 98.6  F (37  C)  Resp: 16  SpO2: 99 %      Physical Exam   Constitutional: She is oriented to person, place, and time.   Cardiovascular:   Pulses:       Posterior tibial pulses are 2+ on the left side.   Musculoskeletal:        Left knee: She exhibits decreased range of motion and swelling. She exhibits no ecchymosis, no deformity, no laceration, no erythema, no LCL laxity and no MCL laxity. Tenderness found. Medial joint line and MCL tenderness noted.        Left ankle: Normal.        Left lower leg: Normal.   Neurological: She is alert and oriented to  person, place, and time. No sensory deficit. GCS eye subscore is 4. GCS verbal subscore is 5. GCS motor subscore is 6.   Skin: Skin is warm, dry and intact. No abrasion, no ecchymosis, no laceration and no rash noted. No erythema.       ED Course     ED Course     Procedures            Critical Care time:  none            Results for orders placed or performed during the hospital encounter of 07/09/18   XR Knee Left 1/2 Views    Narrative    XR KNEE LT 1/2 VW 7/9/2018 4:12 PM    HISTORY: Pain after injury.    COMPARISON: None.      Impression    IMPRESSION: 2 views of the left knee show no fracture or malalignment.  Mild fullness in the suprapatellar bursa region raises the possibility  of a small knee joint effusion. Chondrocalcinosis of the menisci is  incidentally noted. This may be on a degenerative basis but can also  be seen with deposition diseases such as CPPD.    BREONNA WINKLER MD     Medications - No data to display    Assessments & Plan (with Medical Decision Making)     I have reviewed the nursing notes.    I have reviewed the findings, diagnosis, plan and need for follow up with the patient.       Discharge Medication List as of 7/9/2018  4:42 PM      START taking these medications    Details   naproxen (NAPROSYN) 500 MG tablet Take 1 tablet (500 mg) by mouth 2 times daily (with meals) for 15 days, Disp-30 tablet, R-0, Local Print           Final diagnoses:   Acute pain of left knee   Chondrocalcinosis of knee, left     52-year-old female presents to urgent care with concern over 4 day history of medial left knee pain.  Patient had stable vital signs upon arrival.  Physical exam findings as described above.  As part of evaluation she did have x-ray of her right knee which did not demonstrate any fracture or malalignment however there was concern for possible small knee effusion and chondrocalcinosis of the menisci were noted.  [place] for patient's symptoms would include chondrocalcinosis, osteoarthritis.   I have lower suspicion for medial collateral ligament at this time given absence of pain however that would remain on differential.  Patient was charged home stable with prescription for naproxen.  She was instructed to follow-up with sports medicine or or so for recheck within the next week, consider steroid injection/physical therapy referral at that time.  Worrisome reasons to return to ER/UC sooner discussed.      Disclaimer: This note consists of symbols derived from keyboarding, dictation, and/or voice recognition software. As a result, there may be errors in the script that have gone undetected.  Please consider this when interpreting information found in the chart.     7/9/2018   [facility] EMERGENCY DEPARTMENT     Lidia Manrique PA-C  07/12/18 1050

## 2018-07-18 ENCOUNTER — OFFICE VISIT (OUTPATIENT)
Dept: ORTHOPEDICS | Facility: CLINIC | Age: 53
End: 2018-07-18
Payer: COMMERCIAL

## 2018-07-18 ENCOUNTER — RADIANT APPOINTMENT (OUTPATIENT)
Dept: GENERAL RADIOLOGY | Facility: CLINIC | Age: 53
End: 2018-07-18
Attending: PEDIATRICS
Payer: COMMERCIAL

## 2018-07-18 VITALS
DIASTOLIC BLOOD PRESSURE: 79 MMHG | HEIGHT: 57 IN | SYSTOLIC BLOOD PRESSURE: 136 MMHG | WEIGHT: 108 LBS | BODY MASS INDEX: 23.3 KG/M2

## 2018-07-18 DIAGNOSIS — M17.0 PRIMARY OSTEOARTHRITIS OF BOTH KNEES: ICD-10-CM

## 2018-07-18 DIAGNOSIS — M25.562 BILATERAL KNEE PAIN: ICD-10-CM

## 2018-07-18 DIAGNOSIS — M11.20 CHONDROCALCINOSIS: ICD-10-CM

## 2018-07-18 DIAGNOSIS — M25.561 ACUTE PAIN OF BOTH KNEES: Primary | ICD-10-CM

## 2018-07-18 DIAGNOSIS — M25.562 ACUTE PAIN OF BOTH KNEES: Primary | ICD-10-CM

## 2018-07-18 DIAGNOSIS — M25.561 BILATERAL KNEE PAIN: ICD-10-CM

## 2018-07-18 PROCEDURE — 73564 X-RAY EXAM KNEE 4 OR MORE: CPT | Mod: RT

## 2018-07-18 PROCEDURE — 99203 OFFICE O/P NEW LOW 30 MIN: CPT | Performed by: PEDIATRICS

## 2018-07-18 NOTE — PROGRESS NOTES
Sports Medicine Clinic Visit    PCP: Kevin Luu    Hermila Bell is a 52 year old female who is seen  as an ER referral presenting with bilateral knee pain. Left significantly worse than right.    Injury: She reports insidious onset of medial knee pain for the last 1.5 weeks. She reports no injury related to the pain. She reports for the first 5 days she could not flex the knee or squat due to pain. She reports her knee pain has improved over the last few days.  - Patient was seen with     Location of Pain: bilateral medial knee (L > R)  Duration of Pain: 1.5 week(s)  Rating of Pain at worst: 10/10  Rating of Pain Currently: 0/10  Symptoms are better with: Other medications: naproxen  Symptoms are worse with: flexion and squatting  Additional Features:   Positive: swelling, catching and locking   Negative: bruising, popping, grinding, instability, paresthesias, numbness and weakness  Other evaluation and/or treatments so far consists of: Other medications: naproxen, ice and heat  Prior History of related problems: nothing    Social History: cleaning and walking    Review of Systems  Skin: no bruising, no swelling  Musculoskeletal: as above  Neurologic: no numbness, paresthesias  Remainder of review of systems is negative including constitutional, CV, pulmonary, GI, except as noted in HPI or medical history.    Patient's current problem list, past medical and surgical history, and family history were reviewed.    Patient Active Problem List   Diagnosis     Right forearm plaque - suspect keloid/hyperplastic growth     CARDIOVASCULAR SCREENING; LDL GOAL LESS THAN 160     24 hour contact handout given     Esophageal reflux     SI (sacroiliac) joint dysfunction     ASCUS favor benign     Past Medical History:   Diagnosis Date     ASCUS favor benign 6/2014    Neg HPV - plan cotest in 3 years     CARDIOVASCULAR SCREENING; LDL GOAL LESS THAN 160 10/31/2010     Past Surgical History:   Procedure  "Laterality Date     SURGICAL HISTORY OF -       ectopic pregnancy     SURGICAL HISTORY OF -            Family History   Problem Relation Age of Onset     Gynecology Mother       from complications during pregnancy     Hypertension Father      C.A.D. No family hx of      Diabetes No family hx of      Cerebrovascular Disease No family hx of      Breast Cancer No family hx of      Cancer - colorectal No family hx of          Objective  /79 (BP Location: Left arm, Patient Position: Chair, Cuff Size: Adult Regular)  Ht 4' 9\" (1.448 m)  Wt 108 lb (49 kg)  BMI 23.37 kg/m2    GENERAL APPEARANCE: healthy, alert and no distress   GAIT: NORMAL  SKIN: no suspicious lesions or rashes  HEENT: Sclera clear, anicteric  CV: good peripheral pulses  RESP: Breathing not labored  NEURO: Normal strength and tone, mentation intact and speech normal  PSYCH:  mentation appears normal and affect normal/bright    Bilateral Knee exam    Inspection:      no effusion, swelling of bruising bilateral    Patella:      Normal patellar tracking noted through range of motion bilateral       Crepitus noted in the patellofemoral joint bilateral    Tender:      None - points to medial joint line on the left    Non Tender:      remainder of knee area bilateral    Knee ROM:      Full active and passive ROM with flexion and extension bilateral    Hip ROM:     Full active and passive ROM bilateral    Strength:      5/5 with knee extension bilateral    Special Tests:     neg (-) Kristen right       neg (-) Lachmans right       neg (-) anterior drawer right       neg (-) posterior drawer right       neg (-) varus at 0 deg and 30 deg right       neg (-) valgus at 0 deg and 30 deg right    Gait:      normal    Neurovascular:      2+ peripheral pulses bilaterally and brisk capillary refill       sensation grossly intact    Radiology  I ordered, visualized and reviewed these images with the patient  KNEE BILATERAL FOUR OR MORE VIEWS  " 7/18/2018 3:11 PM    HISTORY: Bilateral knee pain.  COMPARISON: None.  IMPRESSION: No acute fracture or dislocation. There is mild  osteoarthritis bilaterally. There is calcification of the articular  cartilages bilaterally.    Assessment:  1. Acute pain of both knees    2. Chondrocalcinosis    3. Primary osteoarthritis of both knees      Bilateral knee pain, likely some component of patellofemoral pain.  Has chondrocalcinosis on x-ray, possible meniscal tear.  We discussed the following treatment options: symptom treatment, activity modification/rest, imaging, rehab and referral. Following discussion, plan: given improvement, will start with physical therapy.    Plan:  - Today's Plan of Care:  Rehab[phone]  Work letter provided    -We also discussed other future treatment options:  MRI of the knee or Steroid injection of knee    Follow Up: 6 - 8 weeks    Concerning signs and symptoms were reviewed.  The patient expressed understanding of this management plan and all questions were answered at this time.    Dinorah Johnston MD CAQ  Primary Care Sports Medicine  [facility]

## 2018-07-18 NOTE — LETTER
7/18/2018         RE: Hermila Bell  [address] [place] [address] [place] [address]        Dear Colleague,    Thank you for referring your patient, Hermila Bell, to the [facility]. Please see a copy of my visit note below.    Sports Medicine Clinic Visit    PCP: Kevin Luu    Hermila Bell is a 52 year old female who is seen  as an ER referral presenting with bilateral knee pain. Left significantly worse than right.    Injury: She reports insidious onset of medial knee pain for the last 1.5 weeks. She reports no injury related to the pain. She reports for the first 5 days she could not flex the knee or squat due to pain. She reports her knee pain has improved over the last few days.  - Patient was seen with     Location of Pain: bilateral medial knee (L > R)  Duration of Pain: 1.5 week(s)  Rating of Pain at worst: 10/10  Rating of Pain Currently: 0/10  Symptoms are better with: Other medications: naproxen  Symptoms are worse with: flexion and squatting  Additional Features:   Positive: swelling, catching and locking   Negative: bruising, popping, grinding, instability, paresthesias, numbness and weakness  Other evaluation and/or treatments so far consists of: Other medications: naproxen, ice and heat  Prior History of related problems: nothing    Social History: cleaning and walking    Review of Systems  Skin: no bruising, no swelling  Musculoskeletal: as above  Neurologic: no numbness, paresthesias  Remainder of review of systems is negative including constitutional, CV, pulmonary, GI, except as noted in HPI or medical history.    Patient's current problem list, past medical and surgical history, and family history were reviewed.    Patient Active Problem List   Diagnosis     Right forearm plaque - suspect keloid/hyperplastic growth     CARDIOVASCULAR SCREENING; LDL GOAL LESS THAN 160     24 hour contact handout given     Esophageal reflux     SI (sacroiliac)  "joint dysfunction     ASCUS favor benign     Past Medical History:   Diagnosis Date     ASCUS favor benign 2014    Neg HPV - plan cotest in 3 years     CARDIOVASCULAR SCREENING; LDL GOAL LESS THAN 160 10/31/2010     Past Surgical History:   Procedure Laterality Date     SURGICAL HISTORY OF -       ectopic pregnancy     SURGICAL HISTORY OF -            Family History   Problem Relation Age of Onset     Gynecology Mother       from complications during pregnancy     Hypertension Father      C.A.D. No family hx of      Diabetes No family hx of      Cerebrovascular Disease No family hx of      Breast Cancer No family hx of      Cancer - colorectal No family hx of          Objective  /79 (BP Location: Left arm, Patient Position: Chair, Cuff Size: Adult Regular)  Ht 4' 9\" (1.448 m)  Wt 108 lb (49 kg)  BMI 23.37 kg/m2    GENERAL APPEARANCE: healthy, alert and no distress   GAIT: NORMAL  SKIN: no suspicious lesions or rashes  HEENT: Sclera clear, anicteric  CV: good peripheral pulses  RESP: Breathing not labored  NEURO: Normal strength and tone, mentation intact and speech normal  PSYCH:  mentation appears normal and affect normal/bright    Bilateral Knee exam    Inspection:      no effusion, swelling of bruising bilateral    Patella:      Normal patellar tracking noted through range of motion bilateral       Crepitus noted in the patellofemoral joint bilateral    Tender:      None - points to medial joint line on the left    Non Tender:      remainder of knee area bilateral    Knee ROM:      Full active and passive ROM with flexion and extension bilateral    Hip ROM:     Full active and passive ROM bilateral    Strength:      5/5 with knee extension bilateral    Special Tests:     neg (-) Kristen right       neg (-) Lachmans right       neg (-) anterior drawer right       neg (-) posterior drawer right       neg (-) varus at 0 deg and 30 deg right       neg (-) valgus at 0 deg and 30 deg " right    Gait:      normal    Neurovascular:      2+ peripheral pulses bilaterally and brisk capillary refill       sensation grossly intact    Radiology  I ordered, visualized and reviewed these images with the patient  KNEE BILATERAL FOUR OR MORE VIEWS  7/18/2018 3:11 PM    HISTORY: Bilateral knee pain.  COMPARISON: None.  IMPRESSION: No acute fracture or dislocation. There is mild  osteoarthritis bilaterally. There is calcification of the articular  cartilages bilaterally.    Assessment:  1. Acute pain of both knees    2. Chondrocalcinosis    3. Primary osteoarthritis of both knees      Bilateral knee pain, likely some component of patellofemoral pain.  Has chondrocalcinosis on x-ray, possible meniscal tear.  We discussed the following treatment options: symptom treatment, activity modification/rest, imaging, rehab and referral. Following discussion, plan: given improvement, will start with physical therapy.    Plan:  - Today's Plan of Care:  Rehab: Physical Therapy: [facility] - [phone]  Work letter provided    -We also discussed other future treatment options:  MRI of the knee or Steroid injection of knee    Follow Up: 6 - 8 weeks    Concerning signs and symptoms were reviewed.  The patient expressed understanding of this management plan and all questions were answered at this time.    Dinorah Johnston MD CAQ  Primary Care Sports Medicine  [facility]    Again, thank you for allowing me to participate in the care of your patient.        Sincerely,        Dinorah Johnston MD

## 2018-07-18 NOTE — MR AVS SNAPSHOT
After Visit Summary   7/18/2018    Hermila Bell    MRN: [id number]           Patient Information     Date Of Birth          1965        Visit Information        Provider Department      7/18/2018 2:30 PM Dinorah Johnston MD; MULTILINGUAL [facility]        Today's Diagnoses     Acute pain of both knees    -  1    Chondrocalcinosis        Primary osteoarthritis of both knees          Care Instructions        Plan:  - Today's Plan of Care:  Rehab: Physical Therapy: [facility] - [phone]  Work letter provided    -We also discussed other future treatment options:  MRI of the knee or Steroid injection of knee    Follow Up: 6 - 8 weeks    If you have any further questions for your physician or physician s care team you can call [phone] and use option 3 to leave a voice message. Calls received during business hours will be returned same day.              Follow-ups after your visit        Additional Services     PHYSICAL THERAPY REFERRAL       *This therapy referral will be filtered to a centralized scheduling office at [facility] and the patient will receive a call to schedule an appointment at a [facility] location most convenient for them. *     [facility] provides Physical Therapy evaluation and treatment and many specialty services across the [facility] system.  If requesting a specialty program, please choose from the list below.    If you have not heard from the scheduling office within 2 business days, please call [phone] for all locations, with the exception of [place], please call [phone] and [facility], please call [phone]  Treatment: Evaluation & Treatment  Special Instructions/Modalities: evaluate and treat  Special Programs: None    Please be aware that coverage of these services is subject to the terms and limitations of your health insurance plan.  Call member services at your  "health plan with any benefit or coverage questions.      **Note to Provider:  If you are referring outside of [facility] for the therapy appointment, please list the name of the location in the \"special instructions\" above, print the referral and give to the patient to schedule the appointment.                  Who to contact     If you have questions or need follow up information about today's clinic visit or your schedule please contact [facility] directly at [phone].  Normal or non-critical lab and imaging results will be communicated to you by [employer], letter or phone within 4 business days after the clinic has received the results. If you do not hear from us within 7 days, please contact the clinic through [employer] or phone. If you have a critical or abnormal lab result, we will notify you by phone as soon as possible.  Submit refill requests through Nanali or call your pharmacy and they will forward the refill request to us. Please allow 3 business days for your refill to be completed.          Additional Information About Your Visit        Nanali Information     Nanali lets you send messages to your doctor, view your test results, renew your prescriptions, schedule appointments and more. To sign up, go to [email or web address] . Click on \"Log in\" on the left side of the screen, which will take you to the Welcome page. Then click on \"Sign up Now\" on the right side of the page.     You will be asked to enter the access code listed below, as well as some personal information. Please follow the directions to create your username and password.     Your access code is: [email or web address]  Expires: 10/16/2018  2:26 PM     Your access code will  in 90 days. If you need help or a new code, please call your [facility] clinic or [phone].        Care EveryWhere ID     This is your Care EveryWhere ID. This could be used by other organizations to access your [facility] medical " "records  [id number]        Your Vitals Were     Height BMI (Body Mass Index)                4' 9\" (1.448 m) 23.37 kg/m2           Blood Pressure from Last 3 Encounters:   07/18/18 136/79   07/09/18 145/81   05/07/18 126/78    Weight from Last 3 Encounters:   07/18/18 108 lb (49 kg)   05/07/18 107 lb (48.5 kg)   05/08/17 113 lb 12.8 oz (51.6 kg)              We Performed the Following     PHYSICAL THERAPY REFERRAL        Primary Care Provider Office Phone # Fax #    Kevin Luu [phone] [address] [place] [address]        Equal Access to Services     [facility] : Hadii esteban vieyra hadsairao Somaurice, waaxda luqadaha, qaybta kaalmada adeegyada, addi pierce . So [facility] [phone].    ATENCIÓN: Si habla español, tiene a butcher disposición servicios gratuitos de asistencia lingüística. Ll[facility] [phone].    We comply with applicable federal civil rights laws and [place] laws. We do not discriminate on the basis of race, color, national origin, age, disability, sex, sexual orientation, or gender identity.            Thank you!     Thank you for choosing [facility]  for your care. Our goal is always to provide you with excellent care. Hearing back from our patients is one way we can continue to improve our services. Please take a few minutes to complete the written survey that you may receive in the mail after your visit with us. Thank you!             Your Updated Medication List - Protect others around you: Learn how to safely use, store and throw away your medicines at www.disposemymeds.org.          This list is accurate as of 7/18/18  3:44 PM.  Always use your most recent med list.                   Brand Name Dispense Instructions for use Diagnosis    ASPIRIN NOT PRESCRIBED    INTENTIONAL    1 each    Please choose reason not prescribed, below    Encounter for gynecological examination without abnormal finding       " clotrimazole 1 % cream    LOTRIMIN    15 g    Apply topically 2 times daily    Genital lesion, female       naproxen 500 MG tablet    NAPROSYN    30 tablet    Take 1 tablet (500 mg) by mouth 2 times daily (with meals) for 15 days        NO ACTIVE MEDICATIONS

## 2018-07-18 NOTE — LETTER
July 18, 2018      Hermila Bell  [address] [place] [phone]        To Whom It May Concern:    Hermila Bell was seen in our clinic. She may return to work without restrictions.      Sincerely,            Dinorah Johnston MD

## 2018-07-18 NOTE — PATIENT INSTRUCTIONS
Plan:  - Today's Plan of Care:  Rehab: [facility] [phone]  Work letter provided    -We also discussed other future treatment options:  MRI of the knee or Steroid injection of knee    Follow Up: 6 - 8 weeks    If you have any further questions for your physician or physician s care team you can call [phone] and use option 3 to leave a voice message. Calls received during business hours will be returned same day.

## 2018-07-18 NOTE — LETTER
July 18, 2018      Hermila Bell  [address] [place] [phone]        To Whom It May Concern:    Hermila Bell was seen in our clinic. She may return to work with the following: please allow Hermila extra time to complete her work. She will be re-evaluated in 6-8 weeks.      Sincerely,            Dinorah Johnston MD

## 2018-07-24 ENCOUNTER — HOSPITAL ENCOUNTER (OUTPATIENT)
Dept: PHYSICAL THERAPY | Facility: CLINIC | Age: 53
Setting detail: THERAPIES SERIES
End: 2018-07-24
Attending: PEDIATRICS
Payer: COMMERCIAL

## 2018-07-24 PROCEDURE — 40000718 ZZHC STATISTIC PT DEPARTMENT ORTHO VISIT: Performed by: PHYSICAL THERAPIST

## 2018-07-24 PROCEDURE — 97161 PT EVAL LOW COMPLEX 20 MIN: CPT | Mod: GP | Performed by: PHYSICAL THERAPIST

## 2018-07-24 PROCEDURE — 97110 THERAPEUTIC EXERCISES: CPT | Mod: GP | Performed by: PHYSICAL THERAPIST

## 2018-07-24 NOTE — PROGRESS NOTES
07/24/18 1200   General Information   Type of Visit Initial OP Ortho PT Evaluation   Start of Care Date 07/24/18   Referring Physician Dinorah Johnston MD   Orders Evaluate and Treat   Date of Order 07/18/18   Insurance Type Other   Insurance Comments/Visits Authorized [employer] [id number]/365   Medical Diagnosis Acute pain of both knees; chondrocalcinosis; primary osteoarthritis of both knees   Surgical/Medical history reviewed Yes   Precautions/Limitations no known precautions/limitations       Present No   Body Part(s)   Body Part(s) Knee   Presentation and Etiology   Pertinent history of current problem (include personal factors and/or comorbidities that impact the POC) Pt reports: a few weeks ago her left knee started to hurt insidiously.  Initially couldn't bend or squat, but it has improved.   Impairments F. Decreased strength and endurance;A. Pain;B. Decreased WB tolerance;P. Bowel or bladder problems   Functional Limitations perform activities of daily living;perform required work activities;perform desired leisure / sports activities   Symptom Location Left tibiofemoral joint line   How/Where did it occur From insidious onset   Onset date of current episode/exacerbation 07/09/18   Chronicity New   Pain rating (0-10 point scale) Best (/10);Worst (/10)   Best (/10) 0   Worst (/10) 10   Pain quality B. Dull   Frequency of pain/symptoms C. With activity   Pain/symptoms are: The same all the time   Pain/symptoms exacerbated by L. Work tasks;M. Other   Pain exacerbation comment Kneeling   Pain/symptoms eased by C. Rest;D. Nothing;F. Certain positions;G. Heat;H. Cold;I. OTC medication(s)   Progression of symptoms since onset: Improved   Current / Previous Interventions   Diagnostic Tests: X-ray   X-ray Results Results   X-ray results 7/9/18 XR left knee: IMPRESSION: 2 views of the left knee show no fracture or malalignment. Mild fullness in the suprapatellar bursa region raises the  possibility of a small knee joint effusion. Chondrocalcinosis of the menisci is incidentally noted. This may be on a degenerative basis but can also be seen with deposition diseases such as CPPD.  7/18/18 XR joni knee: IMPRESSION: No acute fracture or dislocation. There is mild osteoarthritis bilaterally. There is calcification of the articular cartilages bilaterally.   Prior Level of Function   Prior Level of Function-Mobility Independent   Prior Level of Function-ADLs Independent   Current Level of Function   Current Community Support Family/friend caregiver   Patient role/employment history A. Employed   Employment Comments  - often kneeling/squatting   Living environment [facility]   Fall Risk Screen   Fall screen completed by PT   Have you fallen 2 or more times in the past year? No   Have you fallen and had an injury in the past year? No   Is patient a fall risk? No   Functional Scales   Functional Scales Other   Other Scales  See LEFS   Knee Objective Findings   Side (if bilateral, select both right and left) Left   Observation Squat: excessive knee anterior translation, bilateral genu valgus    Gait/Locomotion Mild bilateral Trendelenburg   Knee Flexibility Comments Dorsiflexion PROM right=5 deg; Left=0 deg   Left Knee Extension AROM Joni=5 deg   Left Knee Flexion AROM Jla=208 deg   Left Knee Flexion Strength 5/5 joni   Left Knee Extension Strength 5/5 joni   Left Hip Abduction Strength Right=4/5; Left=3+/5   Planned Therapy Interventions   Planned Therapy Interventions ADL retraining;balance training;gait training;joint mobilization;manual therapy;motor coordination training;neuromuscular re-education;ROM;strengthening;stretching   Planned Modality Interventions   Planned Modality Interventions Cryotherapy   Clinical Impression   Criteria for Skilled Therapeutic Interventions Met yes, treatment indicated   PT Diagnosis Left > right knee pain likely meniscal in nature   Influenced by the following  impairments Pain; weakness; impaired ROM; impaired gait and proprioception   Functional limitations due to impairments Pain and difficulty with work duties, performing ADL's, performing IADL's   Clinical Presentation Stable/Uncomplicated   Clinical Presentation Rationale (+) improvements in pain over the past weeks; overall health   Clinical Decision Making (Complexity) Low complexity   Therapy Frequency other (see comments)   Predicted Duration of Therapy Intervention (days/wks) 1x every other week for 8 weeks   Risk & Benefits of therapy have been explained Yes   Patient, Family & other staff in agreement with plan of care Yes   Clinical Impression Comments Hermila is a pleasant 51 yo female who presents today with insidious onset left knee pain.  She will benefit from skilled PT.   Education Assessment   Preferred Learning Style Listening;Demonstration;Pictures/video   Barriers to Learning No barriers   ORTHO GOALS   PT Ortho Eval Goals 1;2;3   Ortho Goal 1   Goal Description Patient will have >=4/5 bilateral hip abduction strength to reduce knee valgus.   Target Date 09/18/18   Ortho Goal 2   Goal Description Patient will be able to kneel without difficulty.   Target Date 09/18/18   Ortho Goal 3   Goal Description Patient will be independent with her HEP.   Target Date 09/04/18   Total Evaluation Time   Total Evaluation Time 20 min       Liss Griffith, PT, DPT  Doctor of Physical Therapy #[id number]  [facility]  [phone]  [email or web address]

## 2018-08-06 ENCOUNTER — HOSPITAL ENCOUNTER (OUTPATIENT)
Dept: PHYSICAL THERAPY | Facility: CLINIC | Age: 53
Setting detail: THERAPIES SERIES
End: 2018-08-06
Attending: PEDIATRICS
Payer: COMMERCIAL

## 2018-08-06 PROCEDURE — 40000718 ZZHC STATISTIC PT DEPARTMENT ORTHO VISIT: Performed by: PHYSICAL THERAPIST

## 2018-08-06 PROCEDURE — 97110 THERAPEUTIC EXERCISES: CPT | Mod: GP | Performed by: PHYSICAL THERAPIST

## 2018-10-02 NOTE — ADDENDUM NOTE
Encounter addended by: Liss Griffith, PT on: 10/2/2018 11:35 AM<BR>     Actions taken: Sign clinical note, Episode resolved

## 2018-10-02 NOTE — PROGRESS NOTES
Outpatient Physical Therapy Discharge Note     Patient: Hermila Bell  : 1965    Beginning/End Dates of Reporting Period:  18 to 10/2/2018    Referring Provider: Dinorah Johnston MD    Therapy Diagnosis: Bilateral knee OA     Client Self Report: Patient reports: the knee pain is completely gone.  Now, just feels anterior left shin pain when walking long distances.    Objective Measurements:  Objective Measure: Squat form  Details: Mild anterior tibial translation; improved frontal plane control         Goals:  Goal Identifier     Goal Description Patient will have >=4/5 bilateral hip abduction strength to reduce knee valgus.   Target Date 18   Date Met      Progress:     Goal Identifier     Goal Description Patient will be able to kneel without difficulty.   Target Date 18   Date Met  18   Progress:     Goal Identifier     Goal Description Patient will be independent with her HEP.   Target Date 18   Date Met  18   Progress:     Progress Toward Goals:   Progress this reporting period: Hermila attended 2 PT sessions to address her bilateral knee pain.  She made good progress with pain reduction, knee and ankle range of motion, and hip strength.        Plan:  Discharge from therapy.    Discharge:    Reason for Discharge: Patient is working independently to achieve final goal    Equipment Issued: Theraband    Discharge Plan: Patient to continue home program.    Liss Griffith, PT, DPT  Doctor of Physical Therapy #[id number]  [facility]  [phone]  [email or web address]

## 2019-07-08 ENCOUNTER — ANCILLARY PROCEDURE (OUTPATIENT)
Dept: GENERAL RADIOLOGY | Facility: CLINIC | Age: 54
End: 2019-07-08
Attending: NURSE PRACTITIONER
Payer: COMMERCIAL

## 2019-07-08 ENCOUNTER — OFFICE VISIT (OUTPATIENT)
Dept: FAMILY MEDICINE | Facility: CLINIC | Age: 54
End: 2019-07-08
Payer: COMMERCIAL

## 2019-07-08 VITALS
WEIGHT: 121 LBS | HEIGHT: 57 IN | OXYGEN SATURATION: 100 % | BODY MASS INDEX: 26.1 KG/M2 | SYSTOLIC BLOOD PRESSURE: 126 MMHG | HEART RATE: 57 BPM | DIASTOLIC BLOOD PRESSURE: 80 MMHG | TEMPERATURE: 97.7 F

## 2019-07-08 DIAGNOSIS — N76.0 BV (BACTERIAL VAGINOSIS): ICD-10-CM

## 2019-07-08 DIAGNOSIS — M25.552 HIP PAIN, LEFT: ICD-10-CM

## 2019-07-08 DIAGNOSIS — B96.89 BV (BACTERIAL VAGINOSIS): ICD-10-CM

## 2019-07-08 DIAGNOSIS — N89.8 VAGINAL ITCHING: ICD-10-CM

## 2019-07-08 DIAGNOSIS — N95.1 PERIMENOPAUSAL: ICD-10-CM

## 2019-07-08 DIAGNOSIS — Z12.11 SPECIAL SCREENING FOR MALIGNANT NEOPLASMS, COLON: ICD-10-CM

## 2019-07-08 DIAGNOSIS — Z12.31 VISIT FOR SCREENING MAMMOGRAM: ICD-10-CM

## 2019-07-08 DIAGNOSIS — Z00.00 ROUTINE GENERAL MEDICAL EXAMINATION AT A HEALTH CARE FACILITY: Primary | ICD-10-CM

## 2019-07-08 LAB
HCG UR QL: NEGATIVE
SPECIMEN SOURCE: ABNORMAL
WET PREP SPEC: ABNORMAL

## 2019-07-08 PROCEDURE — 72170 X-RAY EXAM OF PELVIS: CPT | Mod: 59

## 2019-07-08 PROCEDURE — 99396 PREV VISIT EST AGE 40-64: CPT | Performed by: NURSE PRACTITIONER

## 2019-07-08 PROCEDURE — 87210 SMEAR WET MOUNT SALINE/INK: CPT | Performed by: NURSE PRACTITIONER

## 2019-07-08 PROCEDURE — 81025 URINE PREGNANCY TEST: CPT | Performed by: NURSE PRACTITIONER

## 2019-07-08 PROCEDURE — 99213 OFFICE O/P EST LOW 20 MIN: CPT | Mod: 25 | Performed by: NURSE PRACTITIONER

## 2019-07-08 PROCEDURE — 73501 X-RAY EXAM HIP UNI 1 VIEW: CPT

## 2019-07-08 RX ORDER — METRONIDAZOLE 500 MG/1
500 TABLET ORAL 2 TIMES DAILY
Qty: 14 TABLET | Refills: 0 | Status: SHIPPED | OUTPATIENT
Start: 2019-07-08 | End: 2019-07-15

## 2019-07-08 ASSESSMENT — MIFFLIN-ST. JEOR: SCORE: 1032.73

## 2019-07-08 NOTE — PATIENT INSTRUCTIONS
You are due for a screening Mammogram.  Please contact the Diagnostics Registration Department at: [phone] to schedule this appointment.  Your clinic record indicates that you are due for:   Colonoscopy or yearly stool blood testing (FIT)        Bacterial Vaginosis:  This is a common inflammation of the vagina caused by bacteria.    It appears to be an overgrowth of several types of bacteria normally located in the vagina.  It is not thought to be a sexually transmitted process and partners do not need to be treated.      The most common symptoms include:  Gray or yellowish discharge, odor (often fishy), itching, irritation, sometimes pelvic discomfort.      This is treated with metronidazole -  500 mg twice daily X 7 days.   Do not drink any alcohol while taking either of these medications.   Avoid intercourse until your symptoms have resolved.    Return to clinic if symptoms do not resolve or worsen despite treatment.      Preventive Health Recommendations  Female Ages 50 - 64    Yearly exam: See your health care provider every year in order to  o Review health changes.   o Discuss preventive care.    o Review your medicines if your doctor has prescribed any.      Get a Pap test every three years (unless you have an abnormal result and your provider advises testing more often).    If you get Pap tests with HPV test, you only need to test every 5 years, unless you have an abnormal result.     You do not need a Pap test if your uterus was removed (hysterectomy) and you have not had cancer.    You should be tested each year for STDs (sexually transmitted diseases) if you're at risk.     Have a mammogram every 1 to 2 years.    Have a colonoscopy at age 50, or have a yearly FIT test (stool test). These exams screen for colon cancer.      Have a cholesterol test every 5 years, or more often if advised.    Have a diabetes test (fasting glucose) every three years. If you are at risk for diabetes, you should have  this test more often.     If you are at risk for osteoporosis (brittle bone disease), think about having a bone density scan (DEXA).    Shots: Get a flu shot each year. Get a tetanus shot every 10 years.    Nutrition:     Eat at least 5 servings of fruits and vegetables each day.    Eat whole-grain bread, whole-wheat pasta and brown rice instead of white grains and rice.    Get adequate Calcium and Vitamin D.     Lifestyle    Exercise at least 150 minutes a week (30 minutes a day, 5 days a week). This will help you control your weight and prevent disease.    Limit alcohol to one drink per day.    No smoking.     Wear sunscreen to prevent skin cancer.     See your dentist every six months for an exam and cleaning.    See your eye doctor every 1 to 2 years.

## 2019-07-08 NOTE — PROGRESS NOTES
SUBJECTIVE:   CC: Hermila Bell is an 53 year old woman who presents for preventive health visit.     Healthy Habits:    Do you get at least three servings of calcium containing foods daily (dairy, green leafy vegetables, etc.)?   yes    Amount of exercise or daily activities, outside of work: none    Problems taking medications regularly not applicable    Medication side effects: No    Have you had an eye exam in the past two years? no    Do you see a dentist twice per year? yes    Do you have sleep apnea, excessive snoring or daytime drowsiness?no      PROBLEMS TO ADD ON...    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    Itching around anus and vagina- between the two area  No vaginal discharge  For 2 weeks  Has used a vagisil douche - no help  Recently changed soaps      Today's PHQ-2 Score:   PHQ-2 ( 1999 [employer]) 7/8/2019 5/8/2017   Q1: Little interest or pleasure in doing things 0 0   Q2: Feeling down, depressed or hopeless 0 0   PHQ-2 Score 0 0       Abuse: Current or Past(Physical, Sexual or Emotional)- No  Do you feel safe in your environment? Yes    Social History     Tobacco Use     Smoking status: Never Smoker     Smokeless tobacco: Never Used   Substance Use Topics     Alcohol use: No     If you drink alcohol do you typically have >3 drinks per day or >7 drinks per week? No                     Reviewed orders with patient.  Reviewed health maintenance and updated orders accordingly - Yes          Pertinent mammograms are reviewed under the imaging tab.    History of abnormal Pap smear: YES - updated in Problem List and Health Maintenance accordingly  PAP / HPV Latest Ref Rng & Units 5/8/2017 6/2/2014 7/2/2012   PAP - NIL ASC-US(A) NIL   HPV 16 DNA NEG Negative - -   HPV 18 DNA NEG Negative - -   OTHER HR HPV NEG Negative - -     Reviewed and updated as needed this  "visit by clinical staff  Tobacco  Allergies  Meds  Med Hx  Surg Hx  Fam Hx  Soc Hx        Reviewed and updated as needed this visit by Provider            ROS:  CONSTITUTIONAL: NEGATIVE for fever, chills, change in weight  INTEGUMENTARU/SKIN: NEGATIVE for worrisome rashes, moles or lesions  EYES: NEGATIVE for vision changes or irritation  ENT: NEGATIVE for ear, mouth and throat problems  RESP: NEGATIVE for significant cough or SOB  BREAST: NEGATIVE for masses, tenderness or discharge  CV: NEGATIVE for chest pain, palpitations or peripheral edema  GI: NEGATIVE for nausea, abdominal pain, heartburn, or change in bowel habits  : NEGATIVE for unusual urinary or vaginal symptoms. Periods are irregular.  MUSCULOSKELETAL: NEGATIVE for significant arthralgias or myalgia  NEURO: NEGATIVE for weakness, dizziness or paresthesias  PSYCHIATRIC: NEGATIVE for changes in mood or affect    OBJECTIVE:   /80 (BP Location: Right arm, Cuff Size: Adult Regular)   Pulse 57   Temp 97.7  F (36.5  C) (Tympanic)   Ht 1.448 m (4' 9\")   Wt 54.9 kg (121 lb)   LMP 06/24/2019 (Approximate)   SpO2 100%   BMI 26.18 kg/m    EXAM:  GENERAL: healthy, alert and no distress  EYES: Eyes grossly normal to inspection, PERRL and conjunctivae and sclerae normal  HENT: ear canals and TM's normal, nose and mouth without ulcers or lesions  NECK: no adenopathy, no asymmetry, masses, or scars and thyroid normal to palpation  RESP: lungs clear to auscultation - no rales, rhonchi or wheezes  BREAST: normal without masses, tenderness or nipple discharge and no palpable axillary masses or adenopathy  CV: regular rate and rhythm, normal S1 S2, no S3 or S4, no murmur, click or rub, no peripheral edema and peripheral pulses strong  ABDOMEN: soft, nontender, no hepatosplenomegaly, no masses and bowel sounds normal   (female): normal female external genitalia and normal urethral meatus   MS: no gross musculoskeletal defects noted, no edema  SKIN: no " "suspicious lesions or rashes  NEURO: Normal strength and tone, mentation intact and speech normal  PSYCH: mentation appears normal, affect normal/bright    Diagnostic Test Results:  Results for orders placed or performed in visit on 07/08/19 (from the past 24 hour(s))   Wet prep   Result Value Ref Range    Specimen Description Vagina     Wet Prep No Trichomonas seen     Wet Prep Moderate  Clue cells seen   (A)     Wet Prep No yeast seen     Wet Prep Rare  WBC'S seen      HCG qualitative urine   Result Value Ref Range    HCG Qual Urine Negative NEG^Negative       ASSESSMENT/PLAN:       ICD-10-CM    1. Routine general medical examination at a health care facility Z00.00    2. Visit for screening mammogram Z12.31 *MA Screening Digital Bilateral   3. Special screening for malignant neoplasms, colon Z12.11 GASTROENTEROLOGY ADULT REF PROCEDURE ONLY     4. Hip pain, left M25.552 Xrays negative.  Pain better this week.  Likely muscle pull.  Advised rest, ice and NSAIDs if needed.  Follow up if no improvement in one month.    XR Pelvis 1/2 Views     XR Hip Left 1 View     5. Perimenopausal N95.1 HCG qualitative urine     6. BV (bacterial vaginosis) N76.0 metroNIDAZOLE (FLAGYL) 500 MG tablet BID for 7 days.  Follow up in 2 weeks if no improvement.    B96.89    7. Vaginal itching N89.8 Wet prep       COUNSELING:   Reviewed preventive health counseling, as reflected in patient instructions    Estimated body mass index is 26.18 kg/m  as calculated from the following:    Height as of this encounter: 1.448 m (4' 9\").    Weight as of this encounter: 54.9 kg (121 lb).    Weight management plan: Discussed healthy diet and exercise guidelines     reports that she has never smoked. She has never used smokeless tobacco.    The risks, benefits and treatment options of prescribed medications or other treatments have been discussed with the patient. The patient verbalized their understanding and should call or follow up if no improvement " or if they develop further problems.    ARANZA Sterling CNP  [facility]

## 2020-01-23 ENCOUNTER — TELEPHONE (OUTPATIENT)
Dept: FAMILY MEDICINE | Facility: CLINIC | Age: 55
End: 2020-01-23

## 2020-01-23 NOTE — LETTER
[facility]  [address]  [facility] [address] [phone]    January 23, 2020      Hermila Bell  [address] [place] [address]  [place] [address]          Dear Hermila,    --Mammogram screening is generally recommended every year starting at age of 50.  Some women may choose to be screened at an earlier age ( between 40 & 50) depending on risk factors and discussions with her primary provider.   Call [phone] to schedule mammogram.   --Colon cancer screening is generally recommended in all patients over the age of 50 years of age. This can be with either colonoscopy every 10 years, or testing the stool for blood one time per year (called a FIT test, a simple card you can send back to us in the mail). On review of our electronic medical record it appears you are due for colon cancer screening. Please call the clinic to assist in setting up a colonoscopy or, if you prefer, setting up the test for stool blood(FIT).    If you have had any of these tests at an outside facility, please attempt to get them sent to us so that we can update your record.     Please disregard this notice if you have already scheduled an appointment.     Sincerely,    Betsy MURDOCK  [facility] - [phone]  [email or web address]

## 2020-01-23 NOTE — TELEPHONE ENCOUNTER
Panel Management Review          Composite cancer screening  Chart review shows that this patient is due/due soon for the following Mammogram and Colonoscopy  Summary:    Patient is due/failing the following:   COLONOSCOPY and MAMMOGRAM    Action needed:   Patient needs referral/order: colonoscopy vs FIT  Schedule mammogram    Type of outreach:    Sent letter.    Questions for provider review:    None                                                                                                                                    JOSTIN SALAZAR

## 2020-02-26 ENCOUNTER — ANCILLARY PROCEDURE (OUTPATIENT)
Dept: GENERAL RADIOLOGY | Facility: CLINIC | Age: 55
End: 2020-02-26
Attending: NURSE PRACTITIONER
Payer: COMMERCIAL

## 2020-02-26 ENCOUNTER — OFFICE VISIT (OUTPATIENT)
Dept: FAMILY MEDICINE | Facility: CLINIC | Age: 55
End: 2020-02-26
Payer: COMMERCIAL

## 2020-02-26 VITALS
WEIGHT: 122 LBS | SYSTOLIC BLOOD PRESSURE: 132 MMHG | OXYGEN SATURATION: 100 % | BODY MASS INDEX: 26.32 KG/M2 | HEART RATE: 63 BPM | TEMPERATURE: 98.7 F | DIASTOLIC BLOOD PRESSURE: 78 MMHG | HEIGHT: 57 IN

## 2020-02-26 DIAGNOSIS — M25.551 HIP PAIN, RIGHT: Primary | ICD-10-CM

## 2020-02-26 DIAGNOSIS — L98.9 SKIN LESION: ICD-10-CM

## 2020-02-26 DIAGNOSIS — L08.9 PUSTULE: ICD-10-CM

## 2020-02-26 DIAGNOSIS — Z12.11 SPECIAL SCREENING FOR MALIGNANT NEOPLASMS, COLON: ICD-10-CM

## 2020-02-26 PROCEDURE — 99214 OFFICE O/P EST MOD 30 MIN: CPT | Performed by: NURSE PRACTITIONER

## 2020-02-26 PROCEDURE — 73502 X-RAY EXAM HIP UNI 2-3 VIEWS: CPT

## 2020-02-26 RX ORDER — NAPROXEN 500 MG/1
500 TABLET ORAL 2 TIMES DAILY WITH MEALS
Qty: 60 TABLET | Refills: 0 | Status: SHIPPED | OUTPATIENT
Start: 2020-02-26 | End: 2021-04-14

## 2020-02-26 ASSESSMENT — MIFFLIN-ST. JEOR: SCORE: 1032.27

## 2020-02-26 NOTE — PATIENT INSTRUCTIONS
You are due for a screening Mammogram.  Please contact the Diagnostics Registration Department at: [phone] to schedule this appointment.  Your clinic record indicates that you are due for:   Pap and physical exam(May 2020)     and Colonoscopy or yearly stool blood testing (FIT).           Thank you for choosing [facility].  You may be receiving an email and/or telephone survey request from [facility] [employer] regarding your visit today.  Please take a few minutes to respond to the survey to let us know how we are doing.      If you have questions or concerns, please contact us via [employer] or you can contact your care team at [phone].    Our Clinic hours are:  Monday 6:40 am  to 7:00 pm  Tuesday -Friday 6:40 am to 5:00 pm    The [place] outpatient lab hours are:  Monday - Friday 6:10 am to 4:45 pm  Saturdays 7:00 am to 11:00 am  Appointments are required, call [phone]    If you have clinical questions after hours or would like to schedule an appointment,  call the clinic at [phone].

## 2020-02-26 NOTE — PROGRESS NOTES
"SUBJECTIVE:     Purpose of the visit   & Chief Complaint        Ms. Bell is a 54 year old female  who presents with right hip and leg pain        present for the duration of the visit: Swazi speaking        Biographical Information   : 1965  Race:  or   No Known Allergies    Socioeconomic History     Marital status:      Spouse name: Not on file     Number of children: Not on file     Years of education: Not on file     Highest education level: Not on file      Patient is her own reliable source of information      History of present illness   Location:  Right hip, groin, leg, buttock   Onset/ duration: two weeks ago  Precipitating factors: none  Quality/ Characteristics: ache, sharpness,  Course since onset:               Treatments tried/ Relief : tylenol and ibuprofen tried w/o relief              Aggravation: walking, sitting, standing, bending, squatting, walking up stairs             No pain lying down or going down stairs. New onset ROM changes and limitations              Denies trauma/injury at the site, denies swelling   Radiation right leg above knee   severity moderate     PROBLEMS TO ADD ON...  Left groin \"bump\"      Pain, irritation, swollen, over the last month, seems like it is getting worse   Right arm skin irritation     Changes to \"skin tag\" post acid placement [place] dermat[place] the [place]     Painful, reddened, elevated, irregular shaped      Medications  Medications:   Prior to Admission medications    Medication Sig Start Date End Date Taking? Authorizing Provider   ASPIRIN NOT PRESCRIBED (INTENTIONAL) Please choose reason not prescribed, below  Patient not taking: Reported on 2018   Betsy Alba, ARANZA CNP   NO ACTIVE MEDICATIONS     Reported, Patient        She has been compliant with all medications.       Past Medical History  Active Problems:   Patient Active Problem List   Diagnosis     Right forearm plaque - suspect " keloid/hyperplastic growth     CARDIOVASCULAR SCREENING; LDL GOAL LESS THAN 160     24 hour contact handout given     Esophageal reflux     SI (sacroiliac) joint dysfunction     ASCUS favor benign        Past Medical History:   Diagnosis Date     ASCUS favor benign 2014    Neg HPV - plan cotest in 3 years     CARDIOVASCULAR SCREENING; LDL GOAL LESS THAN 160 10/31/2010     Operations:   Past Surgical History:   Procedure Laterality Date     SURGICAL HISTORY OF -       ectopic pregnancy     SURGICAL HISTORY OF -              Most Recent Immunizations   Administered Date(s) Administered     TD (ADULT, 7+) 2006     TDAP Vaccine (Adacel) 2016       Patient's last menstrual period was 2019 (approximate).        Family History    Family History   Problem Relation Age of Onset     Gynecology Mother          from complications during pregnancy     Hypertension Father      C.A.D. No family hx of      Diabetes No family hx of      Cerebrovascular Disease No family hx of      Breast Cancer No family hx of      Cancer - colorectal No family hx of             Personal and Psychosocial History  SOCIAL HISTORY:   Social History     Tobacco Use     Smoking status: Never Smoker     Smokeless tobacco: Never Used   Substance Use Topics     Alcohol use: No          Insurance: Payor: [employer] / Plan: [employer] OPEN ACCESS / Product Type: HMO /         Review of Systems    General: denies: weight gain or  loss      Neck: denies: throat pain, difficulty swallowing             Swollen glands, lumps pain or stiffness    Respiratory: denies: cough, SOB, wheezing,                  Difficulty Breathing with or without activity, sputum,                  snoring, frequent respiratory infections, seasonal allergies     Cardiac: denies: chest pain, edema, orthopnea     Gastrointestinal: denies: poor appetite, dysphagia                  Stool changes, heartburn, nausea, vomiting, diarrhea     Urinary:  "denies: urinary frequency, nocturia,                  Polyuria, hematuria, incontinence, CVA tenderness    Genitalia:  Denies: menstrual period irregularity, Vaginal discharge                   See HPI                      Peripheral vascular: see HPI     Musculoskeletal: denies:  swelling, or   Trauma, see HPI      OBJECTIVE  Comprehensive Physical Exam    /78 (BP Location: Right arm)   Pulse 63   Temp 98.7  F (37.1  C) (Tympanic)   Ht 1.448 m (4' 9\")   Wt 55.3 kg (122 lb)   LMP 06/24/2019 (Approximate)   SpO2 100%   BMI 26.40 kg/m       General: Alert & oriented, afebrile and normotensive     mental status: within normal expectations, no acute distress, Latvian speaking  at bedside     skin: rash/lesion/ irritation area noted in RUE, forearm: see HPI add on       rectal, genitalia, pelvic :  external genitalia: changes to labial tissue with noted cyst formation, slightly erythremic with purulent pocket noted to be starting to drain           musculoskeletal system:  ROM intact, adequate strength for age,     peripheral vascular system :  no edema, peripheral pulses 2+ and equal bilaterally        Laboratory data:  No Laboratory data done at this visit   Xray  imaging was reviewed in the medical record: right hip and pelvis  xray       Assessment      Diagnosis 1:  Hip pain -primary   Ms. Bell 's history and physical exam are consistent with right hip pain   Differential diagnoses for this patient's chief complaint include arthritis, bursitits & degenerative disorder.     diagnosis 2: left labial cyst     Diagnosis: 3: right arm skin lesion/irritation          Treatment   Plan      A mutually agreed upon wholistic treatment plan is developed with the patient that includes:      1.   Rx:naproxen (NAPROSYN) 500 MG table  for pain   Patient education: 1. Start the naproxen for pain as needed,    2. We will follow-up with the results of the xray checking for arthriis: refer to Physical " therapy and/or ortho for injection as needed  3. Warm sitz bath for labial cyst and/or manual expulsion as comfort allows. 4. Follow up with dermatology regarding right arm irritation 5. Return or call the clinic if any of the above do not resolve or get worse       Patient sates understanding of plan.             Rossi Brandon RN, Family Nurse Practitioner Student

## 2020-02-26 NOTE — PROGRESS NOTES
"Subjective     Hermila Bell is a 54 year old female who presents to clinic today for the following health issues:    HPI   Musculoskeletal problem/pain      Duration: two weeks ago    Description  Location: right buttocks and right thigh    Intensity:  moderate    Accompanying signs and symptoms: Started in her whole right leg, now more centrally located in buttock and thigh    Can't bend over to put on shoes    Pain with walking and going up stairs    History  Previous similar problem: YES- on the left side  Previous evaluation:  none    Precipitating or alleviating factors:  Trauma or overuse: no   Aggravating factors include: walking and sitting    Therapies tried and outcome: heat and stretching- helps the pain      Vaginal Symptoms      Duration: one month    Description  Painful lump - near left labia  Getting bigger  No other vaginal symptoms  Sore     Intensity:  moderate    Accompanying signs and symptoms (fever/dysuria/abdominal or back pain): None    History  Sexually active: yes, single partner, contraception - not needed  Possibility of pregnancy: No  Recent antibiotic use: no     Precipitating or alleviating factors: None    Therapies tried and outcome: none                 Reviewed and updated as needed this visit by Provider         Review of Systems   ROS COMP: Constitutional, HEENT, cardiovascular, pulmonary, gi and gu systems are negative, except as otherwise noted.      Objective    /78 (BP Location: Right arm)   Pulse 63   Temp 98.7  F (37.1  C) (Tympanic)   Ht 1.448 m (4' 9\")   Wt 55.3 kg (122 lb)   LMP 06/24/2019 (Approximate)   SpO2 100%   BMI 26.40 kg/m    Body mass index is 26.4 kg/m .  Physical Exam   GENERAL: healthy, alert and no distress   (female): 3 mm pustule on erythematous base - coming to a head - started to drain with palpation. No fluctuance under the pustule or surrounding it.  MS: hip exam Hip appears normal, non-tender. Passive ROM limited to 20% of normal due " "to pain. Active ROM limited to 50% of normal. Pain with adduction, not abduction. Strength 5/5.  SKIN: incidentally noted a lesion on the right forearm - 2 cm in size, raised and pink with irregular borders. Patient states that it started after a skin tag was treated with acid in the area. Is growing.            Assessment & Plan       ICD-10-CM    1. Hip pain, right M25.551 naproxen (NAPROSYN) 500 MG tablet BID for pain if needed     XR Pelvis and Hip Right 1 View     2. Pustule L08.9 Pustule in between labia and inner thigh.  Started to drain after palpation during exam.  Advised soaking in warm bath 2-3 times per day.  May express manually in the bath.  Follow up in no resolution in 2-3 days.     3. Skin lesion L98.9 Large suspicious lesion on the right forearm.  Advised dermatology consultation.     4. Special screening for malignant neoplasms, colon Z12.11 Fecal colorectal cancer screen FIT      present for entire visit.     BMI:   Estimated body mass index is 26.4 kg/m  as calculated from the following:    Height as of this encounter: 1.448 m (4' 9\").    Weight as of this encounter: 55.3 kg (122 lb).               No follow-ups on file.    ARANZA [facility]      "

## 2020-02-27 DIAGNOSIS — M25.551 HIP PAIN, RIGHT: Primary | ICD-10-CM

## 2020-03-03 ENCOUNTER — OFFICE VISIT (OUTPATIENT)
Dept: DERMATOLOGY | Facility: CLINIC | Age: 55
End: 2020-03-03
Payer: COMMERCIAL

## 2020-03-03 VITALS
RESPIRATION RATE: 16 BRPM | OXYGEN SATURATION: 99 % | HEART RATE: 74 BPM | DIASTOLIC BLOOD PRESSURE: 74 MMHG | SYSTOLIC BLOOD PRESSURE: 144 MMHG

## 2020-03-03 DIAGNOSIS — D48.5 NEOPLASM OF UNCERTAIN BEHAVIOR OF SKIN: Primary | ICD-10-CM

## 2020-03-03 PROCEDURE — 11102 TANGNTL BX SKIN SINGLE LES: CPT | Performed by: PHYSICIAN ASSISTANT

## 2020-03-03 PROCEDURE — 88305 TISSUE EXAM BY PATHOLOGIST: CPT | Mod: TC | Performed by: PHYSICIAN ASSISTANT

## 2020-03-03 PROCEDURE — 99214 OFFICE O/P EST MOD 30 MIN: CPT | Mod: 25 | Performed by: PHYSICIAN ASSISTANT

## 2020-03-03 NOTE — LETTER
3/3/2020         RE: Hermila Bell  [address] [place] [address] [place] [address]        Dear Colleague,    Thank you for referring your patient, Hermila Bell, to the [facility]. Please see a copy of my visit note below.    Hermila Bell is a 54 year old year old female patient here today for spot on forearm present for many years. Growing in size. It was treated with liquid nitrogen in the past. Patient has no other skin complaints today.  Remainder of the HPI, Meds, PMH, Allergies, FH, and SH was reviewed in chart.    Pertinent Hx:   No personal history of skin cancer.   Past Medical History:   Diagnosis Date     ASCUS favor benign 2014    Neg HPV - plan cotest in 3 years     CARDIOVASCULAR SCREENING; LDL GOAL LESS THAN 160 10/31/2010       Past Surgical History:   Procedure Laterality Date     SURGICAL HISTORY OF -       ectopic pregnancy     SURGICAL HISTORY OF -               Family History   Problem Relation Age of Onset     Gynecology Mother          from complications during pregnancy     Hypertension Father      C.A.D. No family hx of      Diabetes No family hx of      Cerebrovascular Disease No family hx of      Breast Cancer No family hx of      Cancer - colorectal No family hx of        Social History     Socioeconomic History     Marital status:      Spouse name: Not on file     Number of children: Not on file     Years of education: Not on file     Highest education level: Not on file   Occupational History     Not on file   Social Needs     Financial resource strain: Not on file     Food insecurity:     Worry: Not on file     Inability: Not on file     Transportation needs:     Medical: Not on file     Non-medical: Not on file   Tobacco Use     Smoking status: Never Smoker     Smokeless tobacco: Never Used   Substance and Sexual Activity     Alcohol use: No     Drug use: No     Sexual activity: Yes     Partners: Male   Lifestyle     Physical activity:     Days per week:  Not on file     Minutes per session: Not on file     Stress: Not on file   Relationships     Social connections:     Talks on phone: Not on file     Gets together: Not on file     Attends Mandaen service: Not on file     Active member of club or organization: Not on file     Attends meetings of clubs or organizations: Not on file     Relationship status: Not on file     Intimate partner violence:     Fear of current or ex partner: Not on file     Emotionally abused: Not on file     Physically abused: Not on file     Forced sexual activity: Not on file   Other Topics Concern     Parent/sibling w/ CABG, MI or angioplasty before 65F 55M? No   Social History Narrative     Not on file       Outpatient Encounter Medications as of 3/3/2020   Medication Sig Dispense Refill     naproxen (NAPROSYN) 500 MG tablet Take 1 tablet (500 mg) by mouth 2 times daily (with meals) 60 tablet 0     ASPIRIN NOT PRESCRIBED (INTENTIONAL) Please choose reason not prescribed, below (Patient not taking: Reported on 5/7/2018) 1 each 0     NO ACTIVE MEDICATIONS        No facility-administered encounter medications on file as of 3/3/2020.              Review Of Systems  Skin: As above  Eyes: negative  Ears/Nose/Throat: negative  Respiratory: No shortness of breath, dyspnea on exertion, cough, or hemoptysis  Cardiovascular: negative  Gastrointestinal: negative  Genitourinary: negative  Musculoskeletal: negative  Neurologic: negative  Psychiatric: negative  Hematologic/Lymphatic/Immunologic: negative  Endocrine: negative      O:   NAD, WDWN, Alert & Oriented, Mood & Affect wnl, Vitals stable   Here today with    BP (!) 144/74   Pulse 74   Resp 16   LMP 06/24/2019 (Approximate)   SpO2 99%    General appearance normal   Vitals stable   Alert, oriented and in no acute distress     2.0 cm pink verrucous plaque on right forearm       Eyes: Conjunctivae/lids:Normal     ENT: Lips: normal    MSK:Normal    Pulm: Breathing  Normal    Neuro/Psych: Orientation:Alert and Orientedx3 ; Mood/Affect:normal     A/P:  1. R/O wart vs verrucal keratosis vs nmsc on right foreram   TANGENTIAL BIOPSY SENT OUT:  After consent, anesthesia with LEC and prep, tangential excision performed and specimen sent out for permanent section histology.  No complications and routine wound care. Patient told to call our office in 1-2 weeks for result.      Discussed risks of hyper and hypopigmentation and recurrence.   Signs and Symptoms of skin cancer discussed with patient.  ABCDEs of melanoma reviewed with patient.  Patient encouraged to perform monthly skin exams.  UV precautions reviewed with patient.  Risks of non-melanoma skin cancer discussed with patient   Return to clinic pending biopsy results.   Hermila to follow up with Primary Care provider regarding elevated blood pressure.      Again, thank you for allowing me to participate in the care of your patient.        Sincerely,        Heather Schultz PA-C

## 2020-03-03 NOTE — LETTER
[facility]  [address] [facility] [address]  Phone: [phone]    March 5, 2020    Hermila Seechor                                                                                                                      [address] [place] [address]            Dear Ms. Bell,    The skin biopsy of your right forearm returned as a verruca vulgaris, a wart. Please let    me know if the wart is starting to return so we can retreat it.     Thank you,      Heather LOPEZ / mmc

## 2020-03-03 NOTE — PROGRESS NOTES
Hermila Bell is a 54 year old year old female patient here today for spot on forearm present for many years. Growing in size. It was treated with liquid nitrogen in the past. Patient has no other skin complaints today.  Remainder of the HPI, Meds, PMH, Allergies, FH, and SH was reviewed in chart.    Pertinent Hx:   No personal history of skin cancer.   Past Medical History:   Diagnosis Date     ASCUS favor benign 2014    Neg HPV - plan cotest in 3 years     CARDIOVASCULAR SCREENING; LDL GOAL LESS THAN 160 10/31/2010       Past Surgical History:   Procedure Laterality Date     SURGICAL HISTORY OF -       ectopic pregnancy     SURGICAL HISTORY OF -               Family History   Problem Relation Age of Onset     Gynecology Mother          from complications during pregnancy     Hypertension Father      C.A.D. No family hx of      Diabetes No family hx of      Cerebrovascular Disease No family hx of      Breast Cancer No family hx of      Cancer - colorectal No family hx of        Social History     Socioeconomic History     Marital status:      Spouse name: Not on file     Number of children: Not on file     Years of education: Not on file     Highest education level: Not on file   Occupational History     Not on file   Social Needs     Financial resource strain: Not on file     Food insecurity:     Worry: Not on file     Inability: Not on file     Transportation needs:     Medical: Not on file     Non-medical: Not on file   Tobacco Use     Smoking status: Never Smoker     Smokeless tobacco: Never Used   Substance and Sexual Activity     Alcohol use: No     Drug use: No     Sexual activity: Yes     Partners: Male   Lifestyle     Physical activity:     Days per week: Not on file     Minutes per session: Not on file     Stress: Not on file   Relationships     Social connections:     Talks on phone: Not on file     Gets together: Not on file     Attends Pentecostalism service: Not on file     Active  member of club or organization: Not on file     Attends meetings of clubs or organizations: Not on file     Relationship status: Not on file     Intimate partner violence:     Fear of current or ex partner: Not on file     Emotionally abused: Not on file     Physically abused: Not on file     Forced sexual activity: Not on file   Other Topics Concern     Parent/sibling w/ CABG, MI or angioplasty before 65F 55M? No   Social History Narrative     Not on file       Outpatient Encounter Medications as of 3/3/2020   Medication Sig Dispense Refill     naproxen (NAPROSYN) 500 MG tablet Take 1 tablet (500 mg) by mouth 2 times daily (with meals) 60 tablet 0     ASPIRIN NOT PRESCRIBED (INTENTIONAL) Please choose reason not prescribed, below (Patient not taking: Reported on 5/7/2018) 1 each 0     NO ACTIVE MEDICATIONS        No facility-administered encounter medications on file as of 3/3/2020.              Review Of Systems  Skin: As above  Eyes: negative  Ears/Nose/Throat: negative  Respiratory: No shortness of breath, dyspnea on exertion, cough, or hemoptysis  Cardiovascular: negative  Gastrointestinal: negative  Genitourinary: negative  Musculoskeletal: negative  Neurologic: negative  Psychiatric: negative  Hematologic/Lymphatic/Immunologic: negative  Endocrine: negative      O:   NAD, WDWN, Alert & Oriented, Mood & Affect wnl, Vitals stable   Here today with    BP (!) 144/74   Pulse 74   Resp 16   LMP 06/24/2019 (Approximate)   SpO2 99%    General appearance normal   Vitals stable   Alert, oriented and in no acute distress     2.0 cm pink verrucous plaque on right forearm       Eyes: Conjunctivae/lids:Normal     ENT: Lips: normal    MSK:Normal    Pulm: Breathing Normal    Neuro/Psych: Orientation:Alert and Orientedx3 ; Mood/Affect:normal     A/P:  1. R/O wart vs verrucal keratosis vs nmsc on right foreram   TANGENTIAL BIOPSY SENT OUT:  After consent, anesthesia with LEC and prep, tangential excision  performed and specimen sent out for permanent section histology.  No complications and routine wound care. Patient told to call our office in 1-2 weeks for result.      Discussed risks of hyper and hypopigmentation and recurrence.   Signs and Symptoms of skin cancer discussed with patient.  ABCDEs of melanoma reviewed with patient.  Patient encouraged to perform monthly skin exams.  UV precautions reviewed with patient.  Risks of non-melanoma skin cancer discussed with patient   Return to clinic pending biopsy results.   Hermila to follow up with Primary Care provider regarding elevated blood pressure.

## 2020-03-03 NOTE — PATIENT INSTRUCTIONS
Wound Care Instructions     FOR SUPERFICIAL WOUNDS     [facility] [phone]    [facility] [phone]                       AFTER 24 HOURS YOU SHOULD REMOVE THE BANDAGE AND BEGIN DAILY DRESSING CHANGES AS FOLLOWS:     1) Remove Dressing.     2) Clean and dry the area with tap water using a Q-tip or sterile gauze pad.     3) Apply Vaseline, Aquaphor, Polysporin ointment or Bacitracin ointment over entire wound.  Do NOT use Neosporin ointment.     4) Cover the wound with a band-aid, or a sterile non-stick gauze pad and micropore paper tape      REPEAT THESE INSTRUCTIONS AT LEAST ONCE A DAY UNTIL THE WOUND HAS COMPLETELY HEALED.    It is an old wives tale that a wound heals better when it is exposed to air and allowed to dry out. The wound will heal faster with a better cosmetic result if it is kept moist with ointment and covered with a bandage.    **Do not let the wound dry out.**      Supplies Needed:      *Cotton tipped applicators (Q-tips)    *Polysporin Ointment or Bacitracin Ointment (NOT NEOSPORIN)    *Band-aids or non-stick gauze pads and micropore paper tape.      PATIENT INFORMATION:    During the healing process you will notice a number of changes. All wounds develop a small halo of redness surrounding the wound.  This means healing is occurring. Severe itching with extensive redness usually indicates sensitivity to the ointment or bandage tape used to dress the wound.  You should call our office if this develops.      Swelling  and/or discoloration around your surgical site is common, particularly when performed around the eye.    All wounds normally drain.  The larger the wound the more drainage there will be.  After 7-10 days, you will notice the wound beginning to shrink and new skin will begin to grow.  The wound is healed when you can see skin has formed over the entire area.  A healed wound has a healthy, shiny look to the surface and is red to dark pink in color  to normalize.  Wounds may take approximately 4-6 weeks to heal.  Larger wounds may take 6-8 weeks.  After the wound is healed you may discontinue dressing changes.    You may experience a sensation of tightness as your wound heals. This is normal and will gradually subside.    Your healed wound may be sensitive to temperature changes. This sensitivity improves with time, but if you re having a lot of discomfort, try to avoid temperature extremes.    Patients frequently experience itching after their wound appears to have healed because of the continue healing under the skin.  Plain Vaseline will help relieve the itching.        POSSIBLE COMPLICATIONS    BLEEDIN. Leave the bandage in place.  2. Use tightly rolled up gauze or a cloth to apply direct pressure over the bandage for 30  minutes.  3. Reapply pressure for an additional 30 minutes if necessary  4. Use additional gauze and tape to maintain pressure once the bleeding has stopped.

## 2020-03-03 NOTE — NURSING NOTE
"Initial BP (!) 144/74   Pulse 74   Resp 16   LMP 06/24/2019 (Approximate)   SpO2 99%  Estimated body mass index is 26.4 kg/m  as calculated from the following:    Height as of 2/26/20: 1.448 m (4' 9\").    Weight as of 2/26/20: 55.3 kg (122 lb). .    Joleen Berry, [facility]    "

## 2020-03-05 LAB — COPATH REPORT: NORMAL

## 2020-03-18 ENCOUNTER — APPOINTMENT (OUTPATIENT)
Dept: INTERPRETER SERVICES | Facility: CLINIC | Age: 55
End: 2020-03-18
Payer: COMMERCIAL

## 2020-11-16 ENCOUNTER — TELEPHONE (OUTPATIENT)
Dept: FAMILY MEDICINE | Facility: CLINIC | Age: 55
End: 2020-11-16

## 2020-11-16 NOTE — LETTER
[facility]  [address] [facility] [address] [phone]    November 16, 2020    Hermila Bell  [address] [place] [address] [place] [address]          Dear Hermila,    --Pap smear screening is recommended every 3 years. Some patients require more frequent Pap smears based on an individual assessment of other risk factors. Please call the clinic to schedule this in the near future.   --Mammogram screening is generally recommended every year starting at age of 50.  Some women may choose to be screened at an earlier age ( between 40 & 50) depending on risk factors and discussions with her primary provider.   Call [phone] to schedule a mammogram.  --Colon cancer screening is generally recommended in all patients over the age of 50 years of age. This can be with either colonoscopy every 10 years, or testing the stool for blood one time per year (called a FIT test, a simple card you can send back to us in the mail). On review of our electronic medical record it appears you are due for colon cancer screening. Please call the clinic to assist in setting up a colonoscopy or, if you prefer, setting up the test for stool blood(FIT).    If you have had any of these tests at an outside facility, please let us know so that we can update your record.     Please disregard this notice if you have already scheduled an appointment.     Sincerely,    Betsy MURDOCK  [facility] - [phone]  [email or web address]

## 2020-11-16 NOTE — TELEPHONE ENCOUNTER
Panel Management Review          Composite cancer screening  Chart review shows that this patient is due/due soon for the following Pap Smear, Mammogram and Colonoscopy  Summary:    Patient is due/failing the following:   COLONOSCOPY, MAMMOGRAM and PAP    Action needed:   Patient needs office visit for physical/ pap  Schedule mammogram  Order for colonoscopy vs FIT.    Type of outreach:    Sent letter.    Questions for provider review:    None                                                                                                                                    JOSTIN SALAZAR        .

## 2021-02-18 ENCOUNTER — APPOINTMENT (OUTPATIENT)
Dept: INTERPRETER SERVICES | Facility: CLINIC | Age: 56
End: 2021-02-18
Payer: COMMERCIAL

## 2021-02-18 ENCOUNTER — OFFICE VISIT (OUTPATIENT)
Dept: FAMILY MEDICINE | Facility: CLINIC | Age: 56
End: 2021-02-18
Payer: COMMERCIAL

## 2021-02-18 VITALS
HEART RATE: 71 BPM | SYSTOLIC BLOOD PRESSURE: 136 MMHG | DIASTOLIC BLOOD PRESSURE: 82 MMHG | BODY MASS INDEX: 27.61 KG/M2 | HEIGHT: 57 IN | OXYGEN SATURATION: 100 % | WEIGHT: 128 LBS | TEMPERATURE: 98.7 F

## 2021-02-18 DIAGNOSIS — Z12.31 ENCOUNTER FOR SCREENING MAMMOGRAM FOR BREAST CANCER: ICD-10-CM

## 2021-02-18 DIAGNOSIS — Z12.11 SPECIAL SCREENING FOR MALIGNANT NEOPLASMS, COLON: ICD-10-CM

## 2021-02-18 DIAGNOSIS — Z12.4 SCREENING FOR MALIGNANT NEOPLASM OF CERVIX: ICD-10-CM

## 2021-02-18 DIAGNOSIS — R10.2 PELVIC PAIN IN FEMALE: Primary | ICD-10-CM

## 2021-02-18 LAB
SPECIMEN SOURCE: NORMAL
WET PREP SPEC: NORMAL

## 2021-02-18 PROCEDURE — 87624 HPV HI-RISK TYP POOLED RSLT: CPT | Performed by: NURSE PRACTITIONER

## 2021-02-18 PROCEDURE — G0145 SCR C/V CYTO,THINLAYER,RESCR: HCPCS | Performed by: NURSE PRACTITIONER

## 2021-02-18 PROCEDURE — 99214 OFFICE O/P EST MOD 30 MIN: CPT | Performed by: NURSE PRACTITIONER

## 2021-02-18 PROCEDURE — 87210 SMEAR WET MOUNT SALINE/INK: CPT | Performed by: NURSE PRACTITIONER

## 2021-02-18 ASSESSMENT — MIFFLIN-ST. JEOR: SCORE: 1054.48

## 2021-02-18 NOTE — PATIENT INSTRUCTIONS
"Chief Complaint   Patient presents with     Fever     fever, coughing and emesis- has been ongoing since 1/8/18. Fever is 102 as of yesterday.        Initial Ht 3' 4.5\" (1.029 m)  Wt 39 lb 3.2 oz (17.8 kg)  BMI 16.8 kg/m2 Estimated body mass index is 16.8 kg/(m^2) as calculated from the following:    Height as of this encounter: 3' 4.5\" (1.029 m).    Weight as of this encounter: 39 lb 3.2 oz (17.8 kg).  Medication Reconciliation: complete    " Please schedule a pelvic ultrasound: [phone]        You are due for a screening Mammogram.  Please contact the Diagnostics Registration Department at: [phone] to schedule this appointment.  Your clinic record indicates that you are due for:   Colonoscopy or yearly stool blood testing (FIT)        Thank you for choosing [facility].  You may be receiving an email and/or telephone survey request from [facility] Customer Experience regarding your visit today.  Please take a few minutes to respond to the survey to let us know how we are doing.      If you have questions or concerns, please contact us via [employer] or you can contact your care team at [phone].    Our Clinic hours are:  Monday 6:40 am  to 7:00 pm  Tuesday -Friday 6:40 am to 5:00 pm    The [place] outpatient lab hours are:  Monday - Friday 6:10 am to 4:45 pm  Saturdays 7:00 am to 11:00 am  Appointments are required, call [phone]    If you have clinical questions after hours or would like to schedule an appointment,  call the clinic at [phone].

## 2021-02-18 NOTE — PROGRESS NOTES
"    Assessment & Plan     Pelvic pain in female  Etiology unclear.  LLQ pain with intercourse for one week.  Wet prep negative for infection.  PAP done - results pending.  Exam otherwise benign.  Recommend US.  - Wet prep  - US Pelvic Complete with Transvaginal; Future    Encounter for screening mammogram for breast cancer  - *MA Screening Digital Bilateral; Future    Special screening for malignant neoplasms, colon  - GASTROENTEROLOGY ADULT REF PROCEDURE ONLY; Future    Screening for malignant neoplasm of cervix  - Pap imaged thin layer screen with HPV - recommended age 30 - 65 years (select HPV order below)  - HPV High Risk Types DNA Cervical      BMI:   Estimated body mass index is 27.7 kg/m  as calculated from the following:    Height as of this encounter: 1.448 m (4' 9\").    Weight as of this encounter: 58.1 kg (128 lb).   Weight management plan: Discussed healthy diet and exercise guidelines    Patient Instructions   Please schedule a pelvic ultrasound: [phone]        You are due for a screening Mammogram.  Please contact the Diagnostics Registration Department at: [phone] to schedule this appointment.  Your clinic record indicates that you are due for:   Colonoscopy or yearly stool blood testing (FIT)        Thank you for choosing [facility].  You may be receiving an email and/or telephone survey request from [facility] Customer Experience regarding your visit today.  Please take a few minutes to respond to the survey to let us know how we are doing.      If you have questions or concerns, please contact us via [employer]admit or you can contact your care team at [phone].    Our Clinic hours are:  Monday 6:40 am  to 7:00 pm  Tuesday -Friday 6:40 am to 5:00 pm    The [place] outpatient lab hours are:  Monday - Friday 6:10 am to 4:45 pm  Saturdays 7:00 am to 11:00 am  Appointments are required, call [phone]    If you have clinical questions after hours or would like to schedule an appointment, " " call the clinic at [phone].        Return in about 4 weeks (around 3/18/2021).    The risks, benefits and treatment options of prescribed medications or other treatments have been discussed with the patient. The patient verbalized their understanding and should call or follow up if no improvement or if they develop further problems.    ARANZA Sterling CNP  [facility] JOSH Cleaning is a 55 year old who presents for the following health issues     HPI       Abdominal/Flank Pain  Onset/Duration: started on Friday  Description:   Character: yesterday was mild pain; today doesn't hurt   Hurts with intercourse  Location: left lower quadrant  Radiation: None  Intensity: moderate  Progression of Symptoms:  same  Accompanying Signs & Symptoms:  Fever/Chills: no  Gas/Bloating: sometimes  Nausea: no  Vomitting: no  Diarrhea: no  Constipation: yes- this is something she always has  Dysuria or Hematuria: no  No vaginal bleeding.  History:   Trauma: no  Previous similar pain:  A long time ago, but it was a tubal pregnancy-  Not as bad today.  Previous tests done: none  Precipitating factors:   Does the pain change with:     Food: no    Bowel Movement: no    Urination: no   Other factors:  no  Therapies tried and outcome: tylenol-    Patient's last menstrual period was 06/24/2019 (approximate).          Review of Systems   Constitutional, HEENT, cardiovascular, pulmonary, gi and gu systems are negative, except as otherwise noted.      Objective    /82   Pulse 71   Temp 98.7  F (37.1  C) (Tympanic)   Ht 1.448 m (4' 9\")   Wt 58.1 kg (128 lb)   LMP 06/24/2019 (Approximate)   SpO2 100%   BMI 27.70 kg/m    Body mass index is 27.7 kg/m .  Physical Exam   GENERAL: healthy, alert and no distress  RESP: lungs clear to auscultation - no rales, rhonchi or wheezes  CV: regular rate and rhythm, normal S1 S2, no S3 or S4, no murmur, click or rub, no peripheral edema and peripheral " pulses strong  ABDOMEN: soft, nontender, no hepatosplenomegaly, no masses and bowel sounds normal   (female): normal female external genitalia, normal urethral meatus, vaginal mucosa, normal cervix/adnexa/uterus without masses or discharge    Results for orders placed or performed in visit on 02/18/21 (from the past 24 hour(s))   Wet prep    Specimen: Vagina   Result Value Ref Range    Specimen Description Vagina     Wet Prep No Trichomonas seen     Wet Prep No clue cells seen     Wet Prep No yeast seen     Wet Prep No WBC's seen

## 2021-02-22 ENCOUNTER — HOSPITAL ENCOUNTER (OUTPATIENT)
Facility: CLINIC | Age: 56
End: 2021-02-22
Attending: SURGERY | Admitting: SURGERY
Payer: COMMERCIAL

## 2021-02-22 ENCOUNTER — APPOINTMENT (OUTPATIENT)
Dept: INTERPRETER SERVICES | Facility: CLINIC | Age: 56
End: 2021-02-22
Payer: COMMERCIAL

## 2021-02-22 DIAGNOSIS — Z11.59 ENCOUNTER FOR SCREENING FOR OTHER VIRAL DISEASES: ICD-10-CM

## 2021-02-22 LAB
COPATH REPORT: NORMAL
PAP: NORMAL

## 2021-02-23 LAB
FINAL DIAGNOSIS: NORMAL
HPV HR 12 DNA CVX QL NAA+PROBE: NEGATIVE
HPV16 DNA SPEC QL NAA+PROBE: NEGATIVE
HPV18 DNA SPEC QL NAA+PROBE: NEGATIVE
SPECIMEN DESCRIPTION: NORMAL
SPECIMEN SOURCE CVX/VAG CYTO: NORMAL

## 2021-03-04 ENCOUNTER — HOSPITAL ENCOUNTER (EMERGENCY)
Facility: CLINIC | Age: 56
Discharge: HOME | End: 2021-03-04
Attending: EMERGENCY MEDICINE | Admitting: EMERGENCY MEDICINE
Payer: COMMERCIAL

## 2021-03-04 ENCOUNTER — APPOINTMENT (OUTPATIENT)
Dept: GENERAL RADIOLOGY | Facility: CLINIC | Age: 56
End: 2021-03-04
Attending: EMERGENCY MEDICINE
Payer: COMMERCIAL

## 2021-03-04 VITALS
RESPIRATION RATE: 16 BRPM | WEIGHT: 120 LBS | TEMPERATURE: 98.2 F | SYSTOLIC BLOOD PRESSURE: 135 MMHG | HEART RATE: 66 BPM | OXYGEN SATURATION: 100 % | HEIGHT: 56 IN | DIASTOLIC BLOOD PRESSURE: 84 MMHG | BODY MASS INDEX: 26.99 KG/M2

## 2021-03-04 DIAGNOSIS — M19.90 ARTHRITIS: ICD-10-CM

## 2021-03-04 LAB
ANION GAP SERPL CALCULATED.3IONS-SCNC: 3 MMOL/L (ref 3–14)
BASOPHILS # BLD AUTO: 0.1 10E9/L (ref 0–0.2)
BASOPHILS NFR BLD AUTO: 0.8 %
BUN SERPL-MCNC: 17 MG/DL (ref 7–30)
CALCIUM SERPL-MCNC: 9.1 MG/DL (ref 8.5–10.1)
CHLORIDE SERPL-SCNC: 106 MMOL/L (ref 94–109)
CO2 SERPL-SCNC: 30 MMOL/L (ref 20–32)
CREAT SERPL-MCNC: 0.71 MG/DL (ref 0.52–1.04)
CRP SERPL-MCNC: 24.1 MG/L (ref 0–8)
DIFFERENTIAL METHOD BLD: NORMAL
EOSINOPHIL # BLD AUTO: 0.1 10E9/L (ref 0–0.7)
EOSINOPHIL NFR BLD AUTO: 1 %
ERYTHROCYTE [DISTWIDTH] IN BLOOD BY AUTOMATED COUNT: 13.7 % (ref 10–15)
GFR SERPL CREATININE-BSD FRML MDRD: >90 ML/MIN/{1.73_M2}
GLUCOSE SERPL-MCNC: 113 MG/DL (ref 70–99)
HCT VFR BLD AUTO: 40.6 % (ref 35–47)
HGB BLD-MCNC: 13 G/DL (ref 11.7–15.7)
IMM GRANULOCYTES # BLD: 0 10E9/L (ref 0–0.4)
IMM GRANULOCYTES NFR BLD: 0.3 %
LYMPHOCYTES # BLD AUTO: 1.4 10E9/L (ref 0.8–5.3)
LYMPHOCYTES NFR BLD AUTO: 23 %
MCH RBC QN AUTO: 29.8 PG (ref 26.5–33)
MCHC RBC AUTO-ENTMCNC: 32 G/DL (ref 31.5–36.5)
MCV RBC AUTO: 93 FL (ref 78–100)
MONOCYTES # BLD AUTO: 0.6 10E9/L (ref 0–1.3)
MONOCYTES NFR BLD AUTO: 9.2 %
NEUTROPHILS # BLD AUTO: 4.1 10E9/L (ref 1.6–8.3)
NEUTROPHILS NFR BLD AUTO: 65.7 %
NRBC # BLD AUTO: 0 10*3/UL
NRBC BLD AUTO-RTO: 0 /100
PLATELET # BLD AUTO: 219 10E9/L (ref 150–450)
POTASSIUM SERPL-SCNC: 4 MMOL/L (ref 3.4–5.3)
RBC # BLD AUTO: 4.36 10E12/L (ref 3.8–5.2)
SODIUM SERPL-SCNC: 139 MMOL/L (ref 133–144)
WBC # BLD AUTO: 6.2 10E9/L (ref 4–11)

## 2021-03-04 PROCEDURE — 99284 EMERGENCY DEPT VISIT MOD MDM: CPT | Performed by: EMERGENCY MEDICINE

## 2021-03-04 PROCEDURE — 80048 BASIC METABOLIC PNL TOTAL CA: CPT | Performed by: EMERGENCY MEDICINE

## 2021-03-04 PROCEDURE — 250N000013 HC RX MED GY IP 250 OP 250 PS 637: Performed by: EMERGENCY MEDICINE

## 2021-03-04 PROCEDURE — 73630 X-RAY EXAM OF FOOT: CPT | Mod: RT

## 2021-03-04 PROCEDURE — 85025 COMPLETE CBC W/AUTO DIFF WBC: CPT | Performed by: EMERGENCY MEDICINE

## 2021-03-04 PROCEDURE — 86140 C-REACTIVE PROTEIN: CPT | Performed by: EMERGENCY MEDICINE

## 2021-03-04 RX ORDER — OMEGA-3 FATTY ACIDS/FISH OIL 300-1000MG
200 CAPSULE ORAL EVERY 4 HOURS PRN
COMMUNITY
End: 2021-03-08 | Stop reason: HOSPADM

## 2021-03-04 RX ORDER — ACETAMINOPHEN 325 MG/1
325-650 TABLET ORAL EVERY 6 HOURS PRN
COMMUNITY
End: 2021-03-08 | Stop reason: HOSPADM

## 2021-03-04 RX ORDER — PREDNISONE 20 MG/1
TABLET ORAL
Qty: 10 TABLET | Refills: 0 | Status: SHIPPED | OUTPATIENT
Start: 2021-03-04 | End: 2021-04-14

## 2021-03-04 RX ADMIN — IBUPROFEN 600 MG: 400 TABLET, FILM COATED ORAL at 10:42

## 2021-03-04 ASSESSMENT — ENCOUNTER SYMPTOMS
ARTHRALGIAS: 1
CHILLS: 0
FEVER: 0
VOMITING: 0
COLOR CHANGE: 1
NAUSEA: 0
NERVOUS/ANXIOUS: 0

## 2021-03-04 ASSESSMENT — MIFFLIN-ST. JEOR: SCORE: 1002.32

## 2021-03-04 NOTE — ED NOTES
Discharge instructions reviewed in detail.  Pt and daughter verbalized understanding.  No further questions or concerns.

## 2021-03-04 NOTE — ED NOTES
Pt presents to ED with complaints of right side, inner foot pain, since Sunday.  Pain with ambulation.  Pt has had one episode which was similar, last June.  Pt was one seen.

## 2021-03-04 NOTE — ED PROVIDER NOTES
History     Chief Complaint   Patient presents with     Foot Pain     HPI  Hermila Bell is a 55 year old female with 5 days of pain in right great toe.  Pain is been getting progressively worse.  Moderate to severe intensity.  Nonradiating.  Located over MTP great toe on right foot.  Pain worsens with with standing or direct pressure.  Erythema of area began yesterday.  She did take Tylenol with minimal relief of her symptoms this morning at approximately 7 AM.  She reports having a similar episode approximately 1 year ago which resolved on its own and she did not seek medical attention at that time.  She denies any traumatic injury.  No fevers or chills.  Otherwise feeling well and in her usual state of health.  She does have an upcoming colonoscopy this next week.  Her primary language is Iranian however she does speak some English and her daughter is at bedside assisting her mother with translation as needed.    The patient's PMHx, Surgical Hx, Allergies, and Medications were all reviewed with the patient.    Allergies:  No Known Allergies    Problem List:    Patient Active Problem List    Diagnosis Date Noted     Esophageal reflux 06/02/2014     Priority: Medium     SI (sacroiliac) joint dysfunction 06/02/2014     Priority: Medium     24 hour contact handout given 07/03/2012     Priority: Medium     EMERGENCY CARE PLAN  Presenting Problem Signs and Symptoms Treatment Plan    Questions or conerns during clinic hours    I will call the clinic directly     Questions or conerns outside clinic hours    I will call the 24 hour nurse line at [phone]    Patient needs to schedule an appointment    I will call the 24 hour scheduling team at [phone] or clinic directly    Same day treatment     I will call the clinic first, nurse line if after hours, urgent care and express care if needed                                 CARDIOVASCULAR SCREENING; LDL GOAL LESS THAN 160 10/31/2010     Priority: Medium     Right  "forearm plaque - suspect keloid/hyperplastic growth 2005     Priority: Medium     2006 : Recommend shave biopsy of lesion            Past Medical History:    Past Medical History:   Diagnosis Date     ASCUS favor benign 2014     CARDIOVASCULAR SCREENING; LDL GOAL LESS THAN 160 10/31/2010       Past Surgical History:    Past Surgical History:   Procedure Laterality Date     SURGICAL HISTORY OF -       ectopic pregnancy     SURGICAL HISTORY OF -              Family History:    Family History   Problem Relation Age of Onset     Gynecology Mother          from complications during pregnancy     Hypertension Father      C.A.D. No family hx of      Diabetes No family hx of      Cerebrovascular Disease No family hx of      Breast Cancer No family hx of      Cancer - colorectal No family hx of        Social History:  Marital Status:   [2]  Social History     Tobacco Use     Smoking status: Never Smoker     Smokeless tobacco: Never Used   Substance Use Topics     Alcohol use: No     Drug use: No        Medications:    predniSONE (DELTASONE) 20 MG tablet  ASPIRIN NOT PRESCRIBED (INTENTIONAL)  naproxen (NAPROSYN) 500 MG tablet  NO ACTIVE MEDICATIONS          Review of Systems   Constitutional: Negative for chills and fever.   Gastrointestinal: Negative for nausea and vomiting.   Musculoskeletal: Positive for arthralgias.   Skin: Positive for color change.   Psychiatric/Behavioral: The patient is not nervous/anxious.        Physical Exam   BP: 135/84  Pulse: 66  Temp: 98.2  F (36.8  C)  Resp: 16  Height: 142.2 cm (4' 8\")  Weight: 54.4 kg (120 lb)  SpO2: 100 %    Physical Exam  GEN: Awake, alert, and cooperative.  Appears mildly distressed due to pain  HENT: MMM. External ears and nose normal bilaterally.  EYES: EOM intact. Conjunctiva clear. No discharge.   NECK: Symmetric, freely mobile.   CV : Extremities warm and well perfused.  PULM: Normal effort.  Speaking in full " sentences.  NEURO: Normal speech. Following commands.   EXT: Tenderness and erythema on superior lateral aspect of the right MTP.  Tenderness with direct pressure.  Calor.  Minimal tenderness with passive range of motion of MTP.  INT: Warm and dry.  See EXT above.       ED Course        Procedures           Critical Care time:  none               Results for orders placed or performed during the hospital encounter of 03/04/21 (from the past 24 hour(s))   CBC with platelets differential   Result Value Ref Range    WBC 6.2 4.0 - 11.0 10e9/L    RBC Count 4.36 3.8 - 5.2 10e12/L    Hemoglobin 13.0 11.7 - 15.7 g/dL    Hematocrit 40.6 35.0 - 47.0 %    MCV 93 78 - 100 fl    MCH 29.8 26.5 - 33.0 pg    MCHC 32.0 31.5 - 36.5 g/dL    RDW 13.7 10.0 - 15.0 %    Platelet Count 219 150 - 450 10e9/L    Diff Method Automated Method     % Neutrophils 65.7 %    % Lymphocytes 23.0 %    % Monocytes 9.2 %    % Eosinophils 1.0 %    % Basophils 0.8 %    % Immature Granulocytes 0.3 %    Nucleated RBCs 0 0 /100    Absolute Neutrophil 4.1 1.6 - 8.3 10e9/L    Absolute Lymphocytes 1.4 0.8 - 5.3 10e9/L    Absolute Monocytes 0.6 0.0 - 1.3 10e9/L    Absolute Eosinophils 0.1 0.0 - 0.7 10e9/L    Absolute Basophils 0.1 0.0 - 0.2 10e9/L    Abs Immature Granulocytes 0.0 0 - 0.4 10e9/L    Absolute Nucleated RBC 0.0    Basic metabolic panel   Result Value Ref Range    Sodium 139 133 - 144 mmol/L    Potassium 4.0 3.4 - 5.3 mmol/L    Chloride 106 94 - 109 mmol/L    Carbon Dioxide 30 20 - 32 mmol/L    Anion Gap 3 3 - 14 mmol/L    Glucose 113 (H) 70 - 99 mg/dL    Urea Nitrogen 17 7 - 30 mg/dL    Creatinine 0.71 0.52 - 1.04 mg/dL    GFR Estimate >90 >60 mL/min/[1.73_m2]    GFR Estimate If Black >90 >60 mL/min/[1.73_m2]    Calcium 9.1 8.5 - 10.1 mg/dL   CRP inflammation   Result Value Ref Range    CRP Inflammation 24.1 (H) 0.0 - 8.0 mg/L   Foot  XR, G/E 3 views, right    Narrative    RIGHT FOOT THREE OR MORE VIEWS  3/4/2021 10:56 AM     HISTORY:  Pain and  inflammation of metatarsophalangeal joint of great  toe, possible gouty arthritis.      Impression    IMPRESSION: There is soft tissue calcification at the medial aspect of  the first metatarsal head. This raises the possibility of gout, and  clinical correlation is recommended. No erosions are identified. There  is chronic calcification/ossification at the Achilles tendon region.  No fracture identified.        Medications   ibuprofen (ADVIL/MOTRIN) tablet 600 mg (600 mg Oral Given 3/4/21 1042)       Assessments & Plan (with Medical Decision Making)   55 year old female with 5 days of progressively worsening right MTP pain with erythema and calor.  Has had similar episode previously.  Given prior similar episodes I suspect gout as a likely etiology of her arthritis.  No fevers, chills or constitutional symptoms.  Minimal discomfort with passive range of motion of MTP.  Area does not appear to be cellulitic but cannot completely exclude.  She has no fever, chills, or constitutional symptoms to suggest underlying infectious etiology.  CBC normal, BMP grossly normal.  CMP elevated 24.1 which would be expected with gouty arthritis.  X-ray of foot shows soft tissue calcification at medial aspect of first metatarsal head also would be consistent with gout.  Plan to treat with short course of prednisone and 60 mg of ibuprofen 3 times daily.  Referral for podiatry made.  If does not respond to treatment would consider arthrocentesis at that time.  Did discuss strict ED return precautions including proximal extension of erythema, worsening pain, or constitutional symptoms.  Patient does have a colonoscopy scheduled for Monday and was told not to take any medications for 2 days prior including ibuprofen.  I would have preferred to do a prednisone taper but due to a 2-day interruption of therapy I opted for 40 mg daily and this can be addressed at follow-up.    I have reviewed the nursing notes.         New Prescriptions     PREDNISONE (DELTASONE) 20 MG TABLET    Take two tablets (= 40mg) each day for 5 (five) days       Final diagnoses:   Arthritis - MTP of right great toe. Likely gout.     Ceasar Fink MD    3/4/2021   [facility] EMERGENCY DEPT    Disclaimer: This note consists of words and symbols derived from keyboarding and dictation using voice recognition software.  As a result, there may be errors that have gone undetected.  Please consider this when interpreting information found in this note.             Ceasar Fink MD  03/04/21 6914

## 2021-03-05 ENCOUNTER — ANESTHESIA EVENT (OUTPATIENT)
Dept: SURGERY | Facility: CLINIC | Age: 56
End: 2021-03-05

## 2021-03-05 DIAGNOSIS — Z11.59 ENCOUNTER FOR SCREENING FOR OTHER VIRAL DISEASES: ICD-10-CM

## 2021-03-05 LAB
SARS-COV-2 RNA RESP QL NAA+PROBE: NORMAL
SPECIMEN SOURCE: NORMAL

## 2021-03-05 PROCEDURE — U0003 INFECTIOUS AGENT DETECTION BY NUCLEIC ACID (DNA OR RNA); SEVERE ACUTE RESPIRATORY SYNDROME CORONAVIRUS 2 (SARS-COV-2) (CORONAVIRUS DISEASE [COVID-19]), AMPLIFIED PROBE TECHNIQUE, MAKING USE OF HIGH THROUGHPUT TECHNOLOGIES AS DESCRIBED BY CMS-2020-01-R: HCPCS | Performed by: SURGERY

## 2021-03-05 PROCEDURE — U0005 INFEC AGEN DETEC AMPLI PROBE: HCPCS | Performed by: SURGERY

## 2021-03-05 NOTE — ANESTHESIA PREPROCEDURE EVALUATION
Anesthesia Pre-Procedure Evaluation    Patient: Hermila Bell   MRN: [id number] : 1965        Preoperative Diagnosis: Special screening for malignant neoplasm of colon [Z12.11]   Procedure : Procedure(s):  COLONOSCOPY     Past Medical History:   Diagnosis Date     ASCUS favor benign 2014    Neg HPV - plan cotest in 3 years     CARDIOVASCULAR SCREENING; LDL GOAL LESS THAN 160 10/31/2010      Past Surgical History:   Procedure Laterality Date     SURGICAL HISTORY OF -       ectopic pregnancy     SURGICAL HISTORY OF -             No Known Allergies   Social History     Tobacco Use     Smoking status: Never Smoker     Smokeless tobacco: Never Used   Substance Use Topics     Alcohol use: No      Wt Readings from Last 1 Encounters:   21 54.4 kg (120 lb)        Anesthesia Evaluation            ROS/MED HX  ENT/Pulmonary:       Neurologic:       Cardiovascular:       METS/Exercise Tolerance:     Hematologic:       Musculoskeletal:       GI/Hepatic:     (+) GERD,     Renal/Genitourinary:       Endo:       Psychiatric/Substance Use:       Infectious Disease:       Malignancy:       Other:               OUTSIDE LABS:  CBC:   Lab Results   Component Value Date    WBC 6.2 2021    HGB 13.0 2021    HGB 12.6 2010    HCT 40.6 2021     2021     BMP:   Lab Results   Component Value Date     2021     2012    POTASSIUM 4.0 2021    POTASSIUM 4.3 2012    CHLORIDE 106 2021    CHLORIDE 105 2012    CO2 30 2021    CO2 26 2012    BUN 17 2021    BUN 18 2012    CR 0.71 2021    CR 0.80 2012     (H) 2021    GLC 95 2015     COAGS: No results found for: PTT, INR, FIBR  POC:   Lab Results   Component Value Date    HCG Negative 2019     HEPATIC:   Lab Results   Component Value Date    ALT 17 2006    AST 24 2006     OTHER:   Lab Results   Component Value Date    DONNA 9.1  03/04/2021    TSH 0.89 07/02/2012    CRP 24.1 (H) 03/04/2021               Clementina Vu, CRNA, APRN CRNA

## 2021-03-06 ENCOUNTER — TELEPHONE (OUTPATIENT)
Dept: LAB | Facility: CLINIC | Age: 56
End: 2021-03-06

## 2021-03-06 LAB
LABORATORY COMMENT REPORT: ABNORMAL
SARS-COV-2 RNA RESP QL NAA+PROBE: POSITIVE
SPECIMEN SOURCE: ABNORMAL

## 2021-03-06 NOTE — TELEPHONE ENCOUNTER
"-Coronavirus (COVID-19) Notification    Informed Lorenzo about pt's positive covid result. He became very upset, adament during the conversation, interrupting stating \"I know you're just trying to do your job, nobody is sick here, we don't have any symptoms, I think this test is inaccurate, and that this is a way of healthcare making more money for the government, I am anti-covid and do not believe in covid....\" He stated he will reach out and cancel all of her appts.     Caller Name (Patient, parent, daughter/son, grandparent, etc)  Spouse-Lorenzo     Reason for call  Notify of Positive Coronavirus (COVID-19) lab results, assess symptoms,  review [facility] recommendations    Lab Result    Lab test:  2019-nCoV rRt-PCR or SARS-CoV-2 PCR    Oropharyngeal AND/OR nasopharyngeal swabs is POSITIVE for 2019-nCoV RNA/SARS-COV-2 PCR (COVID-19 virus)    RN Recommendations/Instructions per [facility] Coronavirus COVID-19 recommendations    Brief introduction script  Introduce self then review script:  \"I am calling on behalf of [employer].  We were notified that your Coronavirus test (COVID-19) for was POSITIVE for the virus.  I have some information to relay to you but first I wanted to mention that the [place] Dept of Health will be contacting you shortly [it's possible MD already called Patient] to talk to you more about how you are feeling and other people you have had contact with who might now also have the virus.  Also, [facility] is Partnering with the [facility] for Covid-19 research, you may be contacted directly by research staff.\"    Assessment (Inquire about Patient's current symptoms)   Assessment   Current Symptoms at time of phone call: (if no symptoms, document No symptoms] none   Symptoms onset (if applicable) n/a     If at time of call, Patients symptoms hare worsened, the Patient should contact 911 or have someone drive them to Emergency Dept promptly:      If Patient calling 911, " "inform 911 personal that you have tested positive for the Coronavirus (COVID-19).  Place mask on and await 911 to arrive.    If Emergency Dept, If possible, please have another adult drive you to the Emergency Dept but you need to wear mask when in contact with other people.      Monoclonal Antibody Administration    You may be eligible to receive a new treatment with a monoclonal antibody for preventing hospitalization in patients at high risk for complications from COVID-19.   This medication is still experimental and available on a limited basis; it is given through an IV and must be given at an infusion center. Please note that not all people who are eligible will receive the medication since it is in limited supply.     Are you interested in being considered for this medication?  No.   Does the patient fit the criteria: Patient declined per spouse    If patient qualifies based on above criteria:  \"We will contact you if you are selected to receive the medication in the next 1-2 days.   This is time sensitive and if you are not selected in the next 1-2 days, you will not receive the medication.  If you do not receive a call to schedule, you have not been selected.\"    Review information with Patient    Your result was positive. This means you have COVID-19 (coronavirus).  We have sent you a letter that reviews the information that I'll be reviewing with you now.    How can I protect others?    If you have symptoms: stay home and away from others (self-isolate) until:    You've had no fever--and no medicine that reduces fever--for 1 full day (24 hours). And       Your other symptoms have gotten better. For example, your cough or breathing has improved. And     At least 10 days have passed since your symptoms started. (If you've been told by a doctor that you have a weak immune system, wait 20 days.)     If you don't have symptoms: Stay home and away from others (self-isolate) until at least 10 days have passed " since your first positive COVID-19 test. (Date test collected)    During this time:    Stay in your own room, including for meals. Use your own bathroom if you can.    Stay away from others in your home. No hugging, kissing or shaking hands. No visitors.     Don't go to work, school or anywhere else.     Clean  high touch  surfaces often (doorknobs, counters, handles, etc.). Use a household cleaning spray or wipes. You'll find a full list on the EPA website at www.epa.gov/pesticide-registration/list-n-disinfectants-use-against-sars-cov-2.     Cover your mouth and nose with a mask, tissue or other face covering to avoid spreading germs.    Wash your hands and face often with soap and water.    Make a list of people you have been in close contact with recently, even if either of you wore a face covering.   ; Start your list from 2 days before you became ill or had a positive test.  ; Include anyone that was within 6 feet of you for a cumulative total of 15 minutes or more in 24 hours. (Example: if you sat next to Kun for 5 minutes in the morning and 10 minutes in the afternoon, then you were in close contact for 15 minutes total that day. Kun would be added to your list.)    A public health worker will call or text you. It is important that you answer. They will ask you questions about possible exposures to COVID-19, such as people you have been in direct contact with and places you have visited.    Tell the people on your list that you have COVID-19; they should stay away from others for 14 days starting from the last time they were in contact with you (unless you are told something different from a public health worker).     Caregivers in these groups are at risk for severe illness due to COVID-19:  o People 65 years and older  o People who live in a nursing home or long-term care facility  o People with chronic disease (lung, heart, cancer, diabetes, kidney, liver, immunologic)  o People who have a weakened  immune system, including those who:  - Are in cancer treatment  - Take medicine that weakens the immune system, such as corticosteroids  - Had a bone marrow or organ transplant  - Have an immune deficiency  - Have poorly controlled HIV or AIDS  - Are obese (body mass index of 40 or higher)  - Smoke regularly    Caregivers should wear gloves while washing dishes, handling laundry and cleaning bedrooms and bathrooms.    Wash and dry laundry with special caution. Don't shake dirty laundry, and use the warmest water setting you can.    If you have a weakened immune system, ask your doctor about other actions you should take.    For more tips, go to www.cdc.gov/coronavirus/2019-ncov/downloads/10Things.pdf.    You should not go back to work until you meet the guidelines above for ending your home isolation. You don't need to be retested for COVID-19 before going back to work--studies show that you won't spread the virus if it's been at least 10 days since your symptoms started (or 20 days, if you have a weak immune system).    Employers: This document serves as formal notice of your employee's medical guidelines for going back to work. They must meet the above guidelines before going back to work in person.    How can I take care of myself?    1. Get lots of rest. Drink extra fluids (unless a doctor has told you not to).    2. Take Tylenol (acetaminophen) for fever or pain. If you have liver or kidney problems, ask your family doctor if it's okay to take Tylenol.     Take either:     650 mg (two 325 mg pills) every 4 to 6 hours, or     1,000 mg (two 500 mg pills) every 8 hours as needed.     Note: Don't take more than 3,000 mg in one day. Acetaminophen is found in many medicines (both prescribed and over-the-counter medicines). Read all labels to be sure you don't take too much.    For children, check the Tylenol bottle for the right dose (based on their age or weight).    3. If you have other health problems (like  cancer, heart failure, an organ transplant or severe kidney disease): Call your specialty clinic if you don't feel better in the next 2 days.    4. Know when to call 911: Emergency warning signs include:    Trouble breathing or shortness of breath    Pain or pressure in the chest that doesn't go away    Feeling confused like you haven't felt before, or not being able to wake up    Bluish-colored lips or face    5. Sign up for [employer]. We know it's scary to hear that you have COVID-19. We want to track your symptoms to make sure you're okay over the next 2 weeks. Please look for an email from [employer]--this is a free, online program that we'll use to keep in touch. To sign up, follow the link in the email. Learn more at [email or web address].    Where can I get more information?    [facility]: [email or web address]    Coronavirus Basics: www.health.[facility].[place]./diseases/coronavirus/basics.html    What to Do If You're Sick: www.cdc.gov/coronavirus/2019-ncov/about/steps-when-sick.html    Ending Home Isolation: www.cdc.gov/coronavirus/2019-ncov/hcp/disposition-in-home-patients.html     Caring for Someone with COVID-19: www.cdc.gov/coronavirus/2019-ncov/if-you-are-sick/care-for-someone.html     [facility] clinical trials (COVID-19 research studies): clinicalaffairs.[facility]/n-clinical-trials     A Positive COVID-19 letter will be sent via [employer] or the mail. (Exception, no letters sent to Presurgerical/Preprocedure Patients)    Hina Sutton

## 2021-03-06 NOTE — TELEPHONE ENCOUNTER
Coronavirus (COVID-19) Notification    Reason for call  Notify of POSITIVE  COVID-19 lab result, assess symptoms,  review [facility] recommendations    Lab Result   Lab test for 2019-nCoV rRt-PCR or SARS-COV-2 PCR  Oropharyngeal AND/OR nasopharyngeal swabs were POSITIVE for 2019-nCoV RNA [OR] SARS-COV-2 RNA (COVID-19) RNA     We have been unable to reach Patient by phone at this time to notify of their Positive COVID-19 result.  Left voicemail message in Finnish w/ interp, requesting a call back to [phone] [facility] for results.        POSITIVE COVID-19 Letter sent.    Hina Sutton

## 2021-03-08 ENCOUNTER — PATIENT OUTREACH (OUTPATIENT)
Dept: NURSING | Facility: CLINIC | Age: 56
End: 2021-03-08
Payer: COMMERCIAL

## 2021-03-08 ENCOUNTER — ANESTHESIA (OUTPATIENT)
Dept: SURGERY | Facility: CLINIC | Age: 56
End: 2021-03-08

## 2021-03-08 DIAGNOSIS — U07.1 INFECTION DUE TO 2019 NOVEL CORONAVIRUS: Primary | ICD-10-CM

## 2021-03-08 NOTE — PROGRESS NOTES
Clinic Care Coordination Contact  Care Coordination Communication      Clinical Data: Patient meeting criteria for ambulatory care coordination referral due to COVID-19 diagnosis on 3/5/21 after routine test pre-procedure .     Patient Contact:   Spoke with: Patient  Introduced self and role of care coordination.   Home COVID-19 instructions and resources were reviewed with patient/caregiver.   Do you have any questions about your medications? no    Patient's spouse does not believe in covid-19. No one in the house has symptoms.     Follow up appointment is scheduled for No.    No e-mail available    Patient accepts Care Coordination enrollment: No, patient declines at this time.   No further outreaches planned. Letter was sent to patient with COVID instructions     Lisa Dailey RN, [facility] RN Coordinator  [facility]   3/8/2021    12:57 PM  [phone]

## 2021-03-18 ENCOUNTER — TELEPHONE (OUTPATIENT)
Dept: FAMILY MEDICINE | Facility: CLINIC | Age: 56
End: 2021-03-18

## 2021-04-14 ENCOUNTER — HOSPITAL ENCOUNTER (OUTPATIENT)
Dept: ULTRASOUND IMAGING | Facility: CLINIC | Age: 56
Discharge: HOME | End: 2021-04-14
Attending: PHYSICIAN ASSISTANT | Admitting: PHYSICIAN ASSISTANT
Payer: COMMERCIAL

## 2021-04-14 ENCOUNTER — OFFICE VISIT (OUTPATIENT)
Dept: FAMILY MEDICINE | Facility: CLINIC | Age: 56
End: 2021-04-14
Payer: COMMERCIAL

## 2021-04-14 VITALS
WEIGHT: 128.8 LBS | HEART RATE: 70 BPM | RESPIRATION RATE: 18 BRPM | DIASTOLIC BLOOD PRESSURE: 80 MMHG | BODY MASS INDEX: 28.88 KG/M2 | TEMPERATURE: 97.4 F | SYSTOLIC BLOOD PRESSURE: 130 MMHG

## 2021-04-14 DIAGNOSIS — R60.0 EDEMA OF RIGHT LOWER LEG: Primary | ICD-10-CM

## 2021-04-14 DIAGNOSIS — M79.671 RIGHT FOOT PAIN: ICD-10-CM

## 2021-04-14 DIAGNOSIS — R60.0 EDEMA OF RIGHT LOWER LEG: ICD-10-CM

## 2021-04-14 PROCEDURE — 99215 OFFICE O/P EST HI 40 MIN: CPT | Performed by: PHYSICIAN ASSISTANT

## 2021-04-14 PROCEDURE — 93971 EXTREMITY STUDY: CPT | Mod: RT

## 2021-04-14 RX ORDER — INDOMETHACIN 50 MG/1
50 CAPSULE ORAL
Qty: 90 CAPSULE | Refills: 0 | Status: SHIPPED | OUTPATIENT
Start: 2021-04-14 | End: 2021-05-11

## 2021-04-14 ASSESSMENT — PAIN SCALES - GENERAL: PAINLEVEL: EXTREME PAIN (8)

## 2021-04-14 NOTE — LETTER
April 14, 2021      Hermila Bell  [address] [place] [address]  [place] [address]        To Whom It May Concern:    Hermila Bell was seen in our clinic. Please excuse Hermila from work from 4/12/21-4/19/21.  She may return to work without restrictions based on the results of her testing.       Sincerely,        Rich Peoples PA-C

## 2021-04-14 NOTE — PROGRESS NOTES
Assessment & Plan   Edema of right lower leg  Pt with unilateral swelling, redness and pain to the RLE. Exam is consistent with this and is concerning for DVT. Vitals are wnl. No chest pain. Stat US was ordered and returned negative for DVT. There was no evidence of infection and patient is without systemic symptoms. Also considered septic joint, but this is less likely. Hx of bursitis in the ankle in the past that improved with Prednisone and NSAIDs. Consider gout as well. Will treat with Indomethacin and return to clinic if not improved. Warning signs and symptoms discussed on when to return to clinic or go to the ER. Pt verbalized understanding.    - US Lower Extremity Venous Duplex Right; Future    Right foot pain  Similar to above. Initially concerned for DVT. US was negative. Considered infection, gout, septic joint. Pt without systemic symptoms. Treat with Indomethacin at this time and follow-up if not improved.   - US Lower Extremity Venous Duplex Right; Future    I spent a total of 58 minutes on the day of the visit.   Time spent doing chart review, history and exam, documentation and further activities per the note    Return if symptoms worsen or fail to improve, for In-Clinic Visit.    CHINO Saab [facility]    Leland Cleaning is a 55 year old who presents for the following health issues     HPI     Concern - Foot pain and swelling   Onset: For 2 weeks   Description: Swelling and pain in the Right foot   Intensity: moderate, severe  Progression of Symptoms:  worsening  Accompanying Signs & Symptoms: trouble walking, intermittent throbbing pain   Previous history of similar problem: says that she has had something similar years ago   Precipitating factors:        Worsened by: none  Alleviating factors:        Improved by: na  Therapies tried and outcome: arthritis type medication     Review of Systems   Constitutional, HEENT, cardiovascular, pulmonary, gi and gu  systems are negative, except as otherwise noted.    History was obtained through a telephone .       Objective    /80 (BP Location: Right arm, Patient Position: Sitting, Cuff Size: Adult Regular)   Pulse 70   Temp 97.4  F (36.3  C) (Tympanic)   Resp 18   Wt 58.4 kg (128 lb 12.8 oz)   LMP 06/24/2019 (Approximate)   BMI 28.88 kg/m    Body mass index is 28.88 kg/m .  Physical Exam   Constitutional: healthy, alert, and no distress  Head: Normocephalic. Atraumatic  Eyes: No conjunctival injection, sclera anicteric  Respiratory: No resp distress.  Musculoskeletal: extremities normal- no gross deformities noted, and normal muscle tone. 1+ edema of the RLE from the foot to the ankle with associated erythema and tenderness. No calf tenderness. Calves are of symmetrical size.   Neurologic: Gait antalgic favoring the right. . CN 2-12 grossly intact  Psychiatric: mentation appears normal and affect normal/bright

## 2021-04-19 ENCOUNTER — OFFICE VISIT (OUTPATIENT)
Dept: FAMILY MEDICINE | Facility: CLINIC | Age: 56
End: 2021-04-19
Payer: COMMERCIAL

## 2021-04-19 VITALS
DIASTOLIC BLOOD PRESSURE: 80 MMHG | WEIGHT: 130 LBS | HEIGHT: 56 IN | TEMPERATURE: 98.6 F | BODY MASS INDEX: 29.25 KG/M2 | SYSTOLIC BLOOD PRESSURE: 140 MMHG | HEART RATE: 64 BPM

## 2021-04-19 DIAGNOSIS — M79.671 RIGHT FOOT PAIN: Primary | ICD-10-CM

## 2021-04-19 PROCEDURE — T1013 SIGN LANG/ORAL INTERPRETER: HCPCS | Mod: GT

## 2021-04-19 PROCEDURE — 99213 OFFICE O/P EST LOW 20 MIN: CPT | Performed by: PHYSICIAN ASSISTANT

## 2021-04-19 ASSESSMENT — MIFFLIN-ST. JEOR: SCORE: 1047.68

## 2021-04-19 NOTE — PROGRESS NOTES
"    Assessment & Plan   Right foot pain  Much improved with Indomethacin. Suspect either gout or some sort of bursitis. No evidence of infectious process on exam. Pt to continue Indomethacin until symptoms completely resolved. Work note written to RTW. If recurs, recommend follow-up in clinic and possible work up for gout.     Return if symptoms worsen or fail to improve, for In-Clinic Visit.    CHINO Saab [facility]    Leland Cleaning is a 55 year old who presents for the following health issues     HPI     Concern - Foot pain and swelling   Onset: 3 weeks  Description: patient is in today for a f/u on last visit due to foot pain.  Patient states pain has gotten better since last visit.  Pain is located at the soul of her foot.   Intensity: mild  Progression of Symptoms:  improving  Therapies tried and outcome: indomethacin   Much improved. Redness and swelling resolved. Pain nearly resolved. Taking Indomethacin as prescribed.   Denies fevers, chills.     Review of Systems   See HPI.     History was obtained via a telephone .       Objective    Temp 98.6  F (37  C) (Tympanic)   Ht 1.422 m (4' 8\")   Wt 59 kg (130 lb)   LMP 06/24/2019 (Approximate)   BMI 29.15 kg/m    Body mass index is 29.15 kg/m .  Physical Exam   Constitutional: healthy, alert, and no distress  Head: Normocephalic. Atraumatic  Eyes: No conjunctival injection, sclera anicteric  Respiratory: No resp distress.  Musculoskeletal: extremities normal- no gross deformities noted, and normal muscle tone. No swelling, erythema,, warmth to the RLE. Non-tender throughout.  Neurologic: Gait normal. CN 2-12 grossly intact  Psychiatric: mentation appears normal and affect normal/bright           "

## 2021-04-19 NOTE — LETTER
April 19, 2021      Hermila Bell  [address] [place] [id number]        To Whom It May Concern:    Hermila Bell was seen in our clinic. She may return to work without restrictions.      Sincerely,        Rich Peoples PA-C

## 2021-05-11 DIAGNOSIS — R60.0 EDEMA OF RIGHT LOWER LEG: ICD-10-CM

## 2021-05-11 DIAGNOSIS — M79.671 RIGHT FOOT PAIN: ICD-10-CM

## 2021-05-11 RX ORDER — INDOMETHACIN 50 MG/1
CAPSULE ORAL
Qty: 90 CAPSULE | Refills: 0 | Status: SHIPPED | OUTPATIENT
Start: 2021-05-11 | End: 2022-02-17

## 2022-02-14 ENCOUNTER — HOSPITAL ENCOUNTER (EMERGENCY)
Facility: CLINIC | Age: 57
Discharge: HOME | End: 2022-02-14
Attending: EMERGENCY MEDICINE | Admitting: EMERGENCY MEDICINE
Payer: COMMERCIAL

## 2022-02-14 ENCOUNTER — APPOINTMENT (OUTPATIENT)
Dept: GENERAL RADIOLOGY | Facility: CLINIC | Age: 57
End: 2022-02-14
Attending: EMERGENCY MEDICINE
Payer: COMMERCIAL

## 2022-02-14 VITALS
SYSTOLIC BLOOD PRESSURE: 154 MMHG | OXYGEN SATURATION: 97 % | RESPIRATION RATE: 28 BRPM | BODY MASS INDEX: 29.15 KG/M2 | DIASTOLIC BLOOD PRESSURE: 91 MMHG | HEART RATE: 54 BPM | WEIGHT: 130 LBS | TEMPERATURE: 98 F

## 2022-02-14 DIAGNOSIS — M19.012 ARTHROSIS OF LEFT ACROMIOCLAVICULAR JOINT: ICD-10-CM

## 2022-02-14 DIAGNOSIS — Z11.4 SCREENING FOR HIV (HUMAN IMMUNODEFICIENCY VIRUS): ICD-10-CM

## 2022-02-14 DIAGNOSIS — Z13.220 SCREENING FOR HYPERLIPIDEMIA: ICD-10-CM

## 2022-02-14 DIAGNOSIS — M25.50 MULTIPLE JOINT PAIN: ICD-10-CM

## 2022-02-14 DIAGNOSIS — Z11.59 NEED FOR HEPATITIS C SCREENING TEST: ICD-10-CM

## 2022-02-14 LAB
ANION GAP SERPL CALCULATED.3IONS-SCNC: 4 MMOL/L (ref 3–14)
BASOPHILS # BLD AUTO: 0 10E3/UL (ref 0–0.2)
BASOPHILS NFR BLD AUTO: 1 %
BUN SERPL-MCNC: 18 MG/DL (ref 7–30)
CALCIUM SERPL-MCNC: 9.2 MG/DL (ref 8.5–10.1)
CHLORIDE BLD-SCNC: 105 MMOL/L (ref 94–109)
CO2 SERPL-SCNC: 29 MMOL/L (ref 20–32)
CREAT SERPL-MCNC: 0.76 MG/DL (ref 0.52–1.04)
EOSINOPHIL # BLD AUTO: 0.1 10E3/UL (ref 0–0.7)
EOSINOPHIL NFR BLD AUTO: 1 %
ERYTHROCYTE [DISTWIDTH] IN BLOOD BY AUTOMATED COUNT: 13.4 % (ref 10–15)
ERYTHROCYTE [SEDIMENTATION RATE] IN BLOOD BY WESTERGREN METHOD: 23 MM/HR (ref 0–30)
GFR SERPL CREATININE-BSD FRML MDRD: >90 ML/MIN/1.73M2
GLUCOSE BLD-MCNC: 112 MG/DL (ref 70–99)
HCT VFR BLD AUTO: 35.9 % (ref 35–47)
HGB BLD-MCNC: 11.9 G/DL (ref 11.7–15.7)
IMM GRANULOCYTES # BLD: 0 10E3/UL
IMM GRANULOCYTES NFR BLD: 0 %
LYMPHOCYTES # BLD AUTO: 1.6 10E3/UL (ref 0.8–5.3)
LYMPHOCYTES NFR BLD AUTO: 27 %
MCH RBC QN AUTO: 29.6 PG (ref 26.5–33)
MCHC RBC AUTO-ENTMCNC: 33.1 G/DL (ref 31.5–36.5)
MCV RBC AUTO: 89 FL (ref 78–100)
MONOCYTES # BLD AUTO: 0.5 10E3/UL (ref 0–1.3)
MONOCYTES NFR BLD AUTO: 8 %
NEUTROPHILS # BLD AUTO: 3.7 10E3/UL (ref 1.6–8.3)
NEUTROPHILS NFR BLD AUTO: 63 %
NRBC # BLD AUTO: 0 10E3/UL
NRBC BLD AUTO-RTO: 0 /100
PLATELET # BLD AUTO: 236 10E3/UL (ref 150–450)
POTASSIUM BLD-SCNC: 3.6 MMOL/L (ref 3.4–5.3)
RBC # BLD AUTO: 4.02 10E6/UL (ref 3.8–5.2)
SODIUM SERPL-SCNC: 138 MMOL/L (ref 133–144)
WBC # BLD AUTO: 5.9 10E3/UL (ref 4–11)

## 2022-02-14 PROCEDURE — 80048 BASIC METABOLIC PNL TOTAL CA: CPT | Performed by: EMERGENCY MEDICINE

## 2022-02-14 PROCEDURE — 86618 LYME DISEASE ANTIBODY: CPT

## 2022-02-14 PROCEDURE — 93005 ELECTROCARDIOGRAM TRACING: CPT | Performed by: EMERGENCY MEDICINE

## 2022-02-14 PROCEDURE — 85025 COMPLETE CBC W/AUTO DIFF WBC: CPT | Performed by: EMERGENCY MEDICINE

## 2022-02-14 PROCEDURE — 93010 ELECTROCARDIOGRAM REPORT: CPT | Performed by: EMERGENCY MEDICINE

## 2022-02-14 PROCEDURE — 36415 COLL VENOUS BLD VENIPUNCTURE: CPT | Performed by: EMERGENCY MEDICINE

## 2022-02-14 PROCEDURE — 73030 X-RAY EXAM OF SHOULDER: CPT | Mod: LT

## 2022-02-14 PROCEDURE — 99285 EMERGENCY DEPT VISIT HI MDM: CPT | Mod: 25 | Performed by: EMERGENCY MEDICINE

## 2022-02-14 PROCEDURE — 84550 ASSAY OF BLOOD/URIC ACID: CPT

## 2022-02-14 PROCEDURE — 87389 HIV-1 AG W/HIV-1&-2 AB AG IA: CPT

## 2022-02-14 PROCEDURE — 85652 RBC SED RATE AUTOMATED: CPT | Performed by: EMERGENCY MEDICINE

## 2022-02-14 PROCEDURE — 86038 ANTINUCLEAR ANTIBODIES: CPT

## 2022-02-14 PROCEDURE — 86803 HEPATITIS C AB TEST: CPT

## 2022-02-14 PROCEDURE — 86431 RHEUMATOID FACTOR QUANT: CPT | Performed by: EMERGENCY MEDICINE

## 2022-02-14 PROCEDURE — 80061 LIPID PANEL: CPT

## 2022-02-14 PROCEDURE — 86200 CCP ANTIBODY: CPT | Performed by: EMERGENCY MEDICINE

## 2022-02-14 RX ORDER — IBUPROFEN 200 MG
600 TABLET ORAL EVERY 6 HOURS PRN
COMMUNITY

## 2022-02-14 RX ORDER — SENNOSIDES 8.6 MG
1300 CAPSULE ORAL
COMMUNITY

## 2022-02-14 RX ORDER — OMEGA-3S/DHA/EPA/FISH OIL 1000-1400
1 CAPSULE,DELAYED RELEASE (ENTERIC COATED) ORAL DAILY
COMMUNITY
End: 2022-02-17

## 2022-02-14 ASSESSMENT — ENCOUNTER SYMPTOMS
NAUSEA: 1
HEADACHES: 0
DIARRHEA: 0
DYSURIA: 0
NECK PAIN: 1
SHORTNESS OF BREATH: 0
FEVER: 0
SORE THROAT: 0
VOMITING: 0
EYE PAIN: 0
COUGH: 0

## 2022-02-14 NOTE — LETTER
February 14, 2022      To Whom It May Concern:      Hermila Bell was seen in our Emergency Department today, 02/14/22. May return to work on 2/16/2022.    Sincerely,        Ceasar Fink MD

## 2022-02-15 LAB
CCP AB SER IA-ACNC: <0.4 U/ML
RHEUMATOID FACT SER NEPH-ACNC: <7 IU/ML

## 2022-02-15 NOTE — DISCHARGE INSTRUCTIONS
Use arm sling for comfort. Take 600 mg of ibuprofen three times per day as needed for pain. Keep your follow up appointment in [place] on Thursday.    You will need to follow up with your primary care provider to find the cause of your joint pain. Some labs were drawn today in the Emergency Department and you can get your results on Thursday.     If your symptoms worsen or you develop new or concerning symptoms, please return to the Emergency Department for further evaluation and treatment.

## 2022-02-15 NOTE — ED TRIAGE NOTES
Left chest pain in to shoulder for awhile getting worse, has appt Thursday but felt couldn't wait. Family translated for triage

## 2022-02-15 NOTE — ED PROVIDER NOTES
History     Chief Complaint   Patient presents with     Chest Pain     HPI  Hermila Bell is a 56 year old female with history of polyarthritis of unknown origin over the past 5 years. Pain in ankle, then knee, now in neck and head.  This seems to happen every few months in different joints.  Usually in her lower extremities.  No known provocative features.  Pointing to her left clavicle. Started in January, went away, came back yesterday went away and now back again today. Pain increases with movement of left arm. Difficulty using arm 2/2 pain. Has been missing work due to pain. Works as a .  No reported trauma.  No fevers, chills, shortness of breath, cough.  No dysuria, urgency or frequency.  No abnormal vaginal discharge.    Chart review shows that I saw her in this department last year and treated for gout of her great toe.  She was treated with NSAIDs and steroids and said she had improvement of her symptoms.  She has been taking Tylenol and ibuprofen at home for pain control.        Due to language barrier, an  was present during the history-taking and subsequent discussion (and for part of the physical exam) with this patient.          The patient's PMHx, Surgical Hx, Allergies, and Medications were all reviewed with the patient.    Allergies:  No Known Allergies    Problem List:    Patient Active Problem List    Diagnosis Date Noted     Esophageal reflux 06/02/2014     Priority: Medium     SI (sacroiliac) joint dysfunction 06/02/2014     Priority: Medium     24 hour contact handout given 07/03/2012     Priority: Medium     EMERGENCY CARE PLAN  Presenting Problem Signs and Symptoms Treatment Plan    Questions or conerns during clinic hours    I will call the clinic directly     Questions or conerns outside clinic hours    I will call the 24 hour nurse line at [phone]    Patient needs to schedule an appointment    I will call the 24 hour scheduling team at [phone] or clinic  directly    Same day treatment     I will call the clinic first, nurse line if after hours, urgent care and express care if needed                                 CARDIOVASCULAR SCREENING; LDL GOAL LESS THAN 160 10/31/2010     Priority: Medium     Right forearm plaque - suspect keloid/hyperplastic growth 2005     Priority: Medium     2006 : Recommend shave biopsy of lesion            Past Medical History:    Past Medical History:   Diagnosis Date     ASCUS favor benign 2014     CARDIOVASCULAR SCREENING; LDL GOAL LESS THAN 160 10/31/2010       Past Surgical History:    Past Surgical History:   Procedure Laterality Date     SURGICAL HISTORY OF -       ectopic pregnancy     SURGICAL HISTORY OF -              Family History:    Family History   Problem Relation Age of Onset     Gynecology Mother          from complications during pregnancy     Hypertension Father      C.A.D. No family hx of      Diabetes No family hx of      Cerebrovascular Disease No family hx of      Breast Cancer No family hx of      Cancer - colorectal No family hx of        Social History:  Marital Status:   [2]  Social History     Tobacco Use     Smoking status: Never Smoker     Smokeless tobacco: Never Used   Substance Use Topics     Alcohol use: No     Drug use: No        Medications:    acetaminophen (TYLENOL 8 HOUR) 650 MG CR tablet  ibuprofen (ADVIL/MOTRIN) 200 MG tablet  indomethacin (INDOCIN) 50 MG capsule  Multiple Vitamins-Minerals (HAIR/SKIN/NAILS) CAPS          Review of Systems   Constitutional: Negative for fever.   HENT: Negative for congestion and sore throat.    Eyes: Negative for pain.   Respiratory: Negative for cough and shortness of breath.    Cardiovascular: Positive for chest pain.   Gastrointestinal: Positive for nausea. Negative for diarrhea and vomiting.   Genitourinary: Negative for dysuria.   Musculoskeletal: Positive for neck pain.   Skin: Negative for rash.   Neurological:  Negative for headaches.       Physical Exam   BP: (!) 158/83  Pulse: 65  Temp: 98  F (36.7  C)  Resp: 12  Weight: 59 kg (130 lb)  SpO2: 99 %    Physical Exam  GEN: Awake, alert, and cooperative. No acute distress.  HENT: MMM. External ears and nose normal bilaterally.  EYES: EOM intact. Conjunctiva clear. No discharge.   NECK: Symmetric, freely mobile.   CV : Regular rate and rhythm.  No murmurs appreciated.  PULM: Normal effort. No wheezes, rales, or rhonchi bilaterally.  ABD: Soft, non-tender, non-distended. No rebound or guarding.   NEURO: Normal speech. Following commands. CN II-XII grossly intact. Answering questions and interacting appropriately.   EXT: Tenderness of left sternoclavicular joint.  No tenderness to palpation along body of clavicle.  AC joint tenderness.  No limited range of motion of shoulder but pain of AC/ SC joint aggravated with movement.  No crepitus, erythema or ecchymosis.  INT: Warm. No diaphoresis. Normal color.        ED Course        Procedures        EKG: Interpreted myself.  Sinus rhythm with rate of 66 bpm.  Normal access.  Delayed R wave progression.  T wave inversion in V2.  However I suspect lead misplacement as there is a biphasic T wave in V2.  No prior ECG for comparison.  Nonspecific ECG.     Critical Care time:  none               Results for orders placed or performed during the hospital encounter of 02/14/22 (from the past 24 hour(s))   XR Shoulder Left 2 Views    Narrative    EXAM: XR SHOULDER 2 VIEW LEFT  LOCATION: [facility]  DATE/TIME: 2/14/2022 9:06 PM    INDICATION: Left shoulder pain.  COMPARISON: None.      Impression    IMPRESSION: Moderate AC joint arthrosis. Glenohumeral joint space is maintained. No acute fracture or dislocation.    Basic metabolic panel   Result Value Ref Range    Sodium 138 133 - 144 mmol/L    Potassium 3.6 3.4 - 5.3 mmol/L    Chloride 105 94 - 109 mmol/L    Carbon Dioxide (CO2) 29 20 - 32 mmol/L    Anion Gap 4 3  - 14 mmol/L    Urea Nitrogen 18 7 - 30 mg/dL    Creatinine 0.76 0.52 - 1.04 mg/dL    Calcium 9.2 8.5 - 10.1 mg/dL    Glucose 112 (H) 70 - 99 mg/dL    GFR Estimate >90 >60 mL/min/1.73m2   CBC with platelets differential    Narrative    The following orders were created for panel order CBC with platelets differential.  Procedure                               Abnormality         Status                     ---------                               -----------         ------                     CBC with platelets and d...[[id number]]                      Final result                 Please view results for these tests on the individual orders.   Erythrocyte sedimentation rate auto   Result Value Ref Range    Erythrocyte Sedimentation Rate 23 0 - 30 mm/hr   CBC with platelets and differential   Result Value Ref Range    WBC Count 5.9 4.0 - 11.0 10e3/uL    RBC Count 4.02 3.80 - 5.20 10e6/uL    Hemoglobin 11.9 11.7 - 15.7 g/dL    Hematocrit 35.9 35.0 - 47.0 %    MCV 89 78 - 100 fL    MCH 29.6 26.5 - 33.0 pg    MCHC 33.1 31.5 - 36.5 g/dL    RDW 13.4 10.0 - 15.0 %    Platelet Count 236 150 - 450 10e3/uL    % Neutrophils 63 %    % Lymphocytes 27 %    % Monocytes 8 %    % Eosinophils 1 %    % Basophils 1 %    % Immature Granulocytes 0 %    NRBCs per 100 WBC 0 <1 /100    Absolute Neutrophils 3.7 1.6 - 8.3 10e3/uL    Absolute Lymphocytes 1.6 0.8 - 5.3 10e3/uL    Absolute Monocytes 0.5 0.0 - 1.3 10e3/uL    Absolute Eosinophils 0.1 0.0 - 0.7 10e3/uL    Absolute Basophils 0.0 0.0 - 0.2 10e3/uL    Absolute Immature Granulocytes 0.0 <=0.4 10e3/uL    Absolute NRBCs 0.0 10e3/uL       Medications   ibuprofen (ADVIL/MOTRIN) tablet 600 mg (has no administration in time range)       Assessments & Plan (with Medical Decision Making)   56 year old female with past medical history of transient polyarthritis of unknown etiology who presents with main complaint of pain in her left sternoclavicular joint and left shoulder.  No inciting injury.  She  reports intermittent polyarthritis over the past 5 years with increasing frequency.  Does not have established care with a primary doctor.  She does have an appointment upcoming in 3 days in the [place] clinic.  On exam, her symptoms deftly do not sound anginal.  ECG without evidence of acute ischemia.  Not feel any further cardiac work-up necessary.  Chest x-ray showed arthrosis of AC joint sternoclavicular joint not well appreciated.  She does have focal tenderness which is reproducible and my suspicion is arthrosis of this joint as well.  Thinks is aggravated by overuse as she works as a .  CBC, ESR, rheumatoid factor, CCP, and BMP obtained while she is here in the emergency department to help further her evaluation.  These were obtained just prior to her disposition but not  today.  She was given a sling, and an work note.  Pain treated with 60 mg ibuprofen while in the ED.    Patient has follow-up scheduled in clinic in 3 days in [place].  Basic laboratory analysis will be obtained and she can follow-up with results on Thursday.    I have reviewed the nursing notes.         Discharge Medication List as of 2/14/2022 11:24 PM          Final diagnoses:   Arthrosis of left acromioclavicular joint     Ceasar Fink MD    2/14/2022   [facility] EMERGENCY DEPT    Disclaimer: This note consists of words and symbols derived from keyboarding and dictation using voice recognition software.  As a result, there may be errors that have gone undetected.  Please consider this when interpreting information found in this note.             Ceasar Fink MD  02/16/22 0002

## 2022-02-15 NOTE — ED NOTES
Pt complains of acute on chronic pain.  Pt points to left clavicle, and says pain starts in jaw/ear and goes down to top of left shoulder.  Pt is A&Ox4.  Denies cough, fevers, and SOA.

## 2022-02-17 ENCOUNTER — OFFICE VISIT (OUTPATIENT)
Dept: FAMILY MEDICINE | Facility: CLINIC | Age: 57
End: 2022-02-17
Payer: COMMERCIAL

## 2022-02-17 VITALS
RESPIRATION RATE: 18 BRPM | WEIGHT: 127 LBS | HEART RATE: 72 BPM | BODY MASS INDEX: 28.57 KG/M2 | SYSTOLIC BLOOD PRESSURE: 126 MMHG | TEMPERATURE: 97.8 F | DIASTOLIC BLOOD PRESSURE: 70 MMHG | HEIGHT: 56 IN

## 2022-02-17 DIAGNOSIS — Z11.4 SCREENING FOR HIV (HUMAN IMMUNODEFICIENCY VIRUS): ICD-10-CM

## 2022-02-17 DIAGNOSIS — Z11.59 NEED FOR HEPATITIS C SCREENING TEST: ICD-10-CM

## 2022-02-17 DIAGNOSIS — Z00.00 ROUTINE GENERAL MEDICAL EXAMINATION AT A HEALTH CARE FACILITY: Primary | ICD-10-CM

## 2022-02-17 DIAGNOSIS — M25.50 MULTIPLE JOINT PAIN: ICD-10-CM

## 2022-02-17 DIAGNOSIS — Z13.220 SCREENING FOR HYPERLIPIDEMIA: ICD-10-CM

## 2022-02-17 DIAGNOSIS — Z12.11 SCREEN FOR COLON CANCER: ICD-10-CM

## 2022-02-17 LAB
CHOLEST SERPL-MCNC: 186 MG/DL
HDLC SERPL-MCNC: 56 MG/DL
LDLC SERPL CALC-MCNC: 113 MG/DL
NONHDLC SERPL-MCNC: 130 MG/DL
TRIGL SERPL-MCNC: 84 MG/DL
URATE SERPL-MCNC: 3.2 MG/DL (ref 2.6–6)

## 2022-02-17 PROCEDURE — 99213 OFFICE O/P EST LOW 20 MIN: CPT | Mod: 25 | Performed by: NURSE PRACTITIONER

## 2022-02-17 PROCEDURE — 99396 PREV VISIT EST AGE 40-64: CPT | Performed by: NURSE PRACTITIONER

## 2022-02-17 ASSESSMENT — PAIN SCALES - GENERAL: PAINLEVEL: NO PAIN (0)

## 2022-02-17 ASSESSMENT — ENCOUNTER SYMPTOMS
PALPITATIONS: 0
MYALGIAS: 1
JOINT SWELLING: 1
HEMATOCHEZIA: 0
SHORTNESS OF BREATH: 0
DIARRHEA: 0
WEAKNESS: 0
HEADACHES: 0
DIZZINESS: 0
DYSURIA: 0
COUGH: 0
SORE THROAT: 0
FEVER: 0
HEMATURIA: 0
HEARTBURN: 0
CHILLS: 0
PARESTHESIAS: 0
FREQUENCY: 0
ARTHRALGIAS: 1
CONSTIPATION: 0
EYE PAIN: 0
ABDOMINAL PAIN: 0
NERVOUS/ANXIOUS: 0

## 2022-02-17 NOTE — PROGRESS NOTES
SUBJECTIVE:   CC: Hermila Bell is an 56 year old woman who presents for preventive health visit.       Patient has been advised of split billing requirements and indicates understanding: Yes  Healthy Habits:     Getting at least 3 servings of Calcium per day:  Yes    Bi-annual eye exam:  NO    Dental care twice a year:  Yes    Sleep apnea or symptoms of sleep apnea:  None    Diet:  Regular (no restrictions)    Frequency of exercise:  None    Taking medications regularly:  Yes    Medication side effects:  None    PHQ-2 Total Score: 0    Additional concerns today:  Yes (left collarbone/ neck pain, right foot pain)          Today's PHQ-2 Score:   PHQ-2 ( 1999 [employer]) 2/17/2022   Q1: Little interest or pleasure in doing things 0   Q2: Feeling down, depressed or hopeless 0   PHQ-2 Score 0   PHQ-2 Total Score (12-17 Years)- Positive if 3 or more points; Administer PHQ-A if positive -   Q1: Little interest or pleasure in doing things Not at all   Q2: Feeling down, depressed or hopeless Not at all   PHQ-2 Score 0       Abuse: Current or Past (Physical, Sexual or Emotional) - No  Do you feel safe in your environment? Yes    Have you ever done Advance Care Planning? (For example, a Health Directive, POLST, or a discussion with a medical provider or your loved ones about your wishes): No, advance care planning information given to patient to review.  Patient declined advance care planning discussion at this time.    Social History     Tobacco Use     Smoking status: Never Smoker     Smokeless tobacco: Never Used   Substance Use Topics     Alcohol use: No     If you drink alcohol do you typically have >3 drinks per day or >7 drinks per week? No    Alcohol Use 2/17/2022   Prescreen: >3 drinks/day or >7 drinks/week? No   Prescreen: >3 drinks/day or >7 drinks/week? -       Reviewed orders with patient.  Reviewed health maintenance and updated orders accordingly - Yes  BP Readings from Last 3 Encounters:   02/17/22 126/70    22 (!) 154/91   21 (!) 140/80    Wt Readings from Last 3 Encounters:   22 57.6 kg (127 lb)   22 59 kg (130 lb)   21 59 kg (130 lb)                  Patient Active Problem List   Diagnosis     Right forearm plaque - suspect keloid/hyperplastic growth     CARDIOVASCULAR SCREENING; LDL GOAL LESS THAN 160     24 hour contact handout given     Esophageal reflux     SI (sacroiliac) joint dysfunction     Past Surgical History:   Procedure Laterality Date     SURGICAL HISTORY OF -       ectopic pregnancy     SURGICAL HISTORY OF -              Social History     Tobacco Use     Smoking status: Never Smoker     Smokeless tobacco: Never Used   Substance Use Topics     Alcohol use: No     Family History   Problem Relation Age of Onset     Gynecology Mother          from complications during pregnancy     Hypertension Father      C.A.D. No family hx of      Diabetes No family hx of      Cerebrovascular Disease No family hx of      Breast Cancer No family hx of      Cancer - colorectal No family hx of          Current Outpatient Medications   Medication Sig Dispense Refill     acetaminophen (TYLENOL 8 HOUR) 650 MG CR tablet Take 1,300 mg by mouth once as needed for mild pain or fever       ibuprofen (ADVIL/MOTRIN) 200 MG tablet Take 600 mg by mouth every 6 hours as needed for mild pain       No Known Allergies  Recent Labs   Lab Test 22  2314 21  1037 04/13/15  0835   *  --  72   HDL 56  --  56   TRIG 84  --  58   CR 0.76 0.71  --    GFRESTIMATED >90 >90  --    GFRESTBLACK  --  >90  --    POTASSIUM 3.6 4.0  --         Breast Cancer Screening:    Breast CA Risk Assessment (FHS-7) 2022   Do you have a family history of breast, colon, or ovarian cancer? No / Unknown       click delete button to remove this line now  Mammogram Screening: Recommended annual mammography  Pertinent mammograms are reviewed under the imaging tab.    History of abnormal Pap smear: NO -  age 30-65 PAP every 5 years with negative HPV co-testing recommended  PAP / HPV Latest Ref Rng & Units 2021   PAP (Historical) - NIL NIL ASC-US(A)   HPV16 NEG:Negative Negative Negative -   HPV18 NEG:Negative Negative Negative -   HRHPV NEG:Negative Negative Negative -     Reviewed and updated as needed this visit by clinical staff                  Reviewed and updated as needed this visit by Provider                 Past Medical History:   Diagnosis Date     ASCUS favor benign 2014    Neg HPV - plan cotest in 3 years     CARDIOVASCULAR SCREENING; LDL GOAL LESS THAN 160 10/31/2010      Past Surgical History:   Procedure Laterality Date     SURGICAL HISTORY OF -       ectopic pregnancy     SURGICAL HISTORY OF -            OB History    Para Term  AB Living   3 2 2 0 1 2   SAB IAB Ectopic Multiple Live Births   0 0 1 0 0      # Outcome Date GA Lbr Neo/2nd Weight Sex Delivery Anes PTL Lv   3 Ectopic            2 Term            1 Term                Review of Systems   Constitutional: Negative for chills and fever.   HENT: Negative for congestion, ear pain, hearing loss and sore throat.    Eyes: Negative for pain and visual disturbance.   Respiratory: Negative for cough and shortness of breath.    Cardiovascular: Positive for peripheral edema. Negative for chest pain and palpitations.   Gastrointestinal: Negative for abdominal pain, constipation, diarrhea, heartburn and hematochezia.   Genitourinary: Negative for dysuria, frequency, genital sores, hematuria and urgency.   Musculoskeletal: Positive for arthralgias, joint swelling and myalgias.   Skin: Negative for rash.   Neurological: Negative for dizziness, weakness, headaches and paresthesias.   Psychiatric/Behavioral: Negative for mood changes. The patient is not nervous/anxious.           OBJECTIVE:   LMP 2019 (Approximate)   Physical Exam  GENERAL: healthy, alert and no distress  EYES: Eyes grossly normal to  inspection, PERRL and conjunctivae and sclerae normal  HENT: ear canals and TM's normal, nose and mouth without ulcers or lesions  NECK: no adenopathy, no asymmetry, masses, or scars and thyroid normal to palpation  RESP: lungs clear to auscultation - no rales, rhonchi or wheezes  BREAST: normal without masses, tenderness or nipple discharge and no palpable axillary masses or adenopathy  CV: regular rate and rhythm, normal S1 S2, no S3 or S4, no murmur, click or rub, no peripheral edema and peripheral pulses strong  ABDOMEN: soft, nontender, no hepatosplenomegaly, no masses and bowel sounds normal  MS: no gross musculoskeletal defects noted, no edema  SKIN: no suspicious lesions or rashes  NEURO: Normal strength and tone, mentation intact and speech normal  PSYCH: mentation appears normal, affect normal/bright    Diagnostic Test Results:  Labs reviewed in Epic  No results found for any visits on 02/17/22.      ASSESSMENT/PLAN:     (Z00.00) Routine general medical examination at a health care facility  (primary encounter diagnosis)  Comment:   Plan: *MA Screening Digital Bilateral      (Z12.11) Screen for colon cancer  Comment:   Plan: Fecal colorectal cancer screen (FIT)            (Z11.4) Screening for HIV (human immunodeficiency virus)  Comment:   Plan: HIV Antigen Antibody Combo            (Z11.59) Need for hepatitis C screening test  Comment:   Plan: Hepatitis C Screen Reflex to HCV RNA Quant and         Genotype            (Z13.220) Screening for hyperlipidemia  Comment:   Plan: Lipid panel reflex to direct LDL Fasting      (M25.50) Multiple joint pain  Comment: will obtain labs   Plan: Lyme Disease Elena with reflex to WB Serum, Anti         Nuclear Elena IgG by IFA with Reflex, Uric acid,         OFFICE/OUTPT VISIT,EST,LEVL III              Patient has been advised of split billing requirements and indicates understanding: Yes    COUNSELING:  Reviewed preventive health counseling, as reflected in patient  "instructions       Regular exercise       Healthy diet/nutrition       Vision screening       Hearing screening       Folic Acid       Osteoporosis prevention/bone health       Colorectal Cancer Screening       ([place])menopause management    Estimated body mass index is 29.15 kg/m  as calculated from the following:    Height as of 4/19/21: 1.422 m (4' 8\").    Weight as of 2/14/22: 59 kg (130 lb).        She reports that she has never smoked. She has never used smokeless tobacco.      Counseling Resources:  ATP IV Guidelines  Pooled Cohorts Equation Calculator  Breast Cancer Risk Calculator  BRCA-Related Cancer Risk Assessment: FHS-7 Tool  FRAX Risk Assessment  ICSI Preventive Guidelines  Dietary Guidelines for Americans, 2010  USDA's MyPlate  ASA Prophylaxis  Lung CA Screening    Carla Higuera, ARANZA CNP  [facility]  "

## 2022-02-18 LAB
ANA SER QL IF: NEGATIVE
B BURGDOR IGG+IGM SER QL: 0.18
HCV AB SERPL QL IA: NONREACTIVE
HIV 1+2 AB+HIV1 P24 AG SERPL QL IA: NONREACTIVE

## 2022-03-05 ENCOUNTER — HEALTH MAINTENANCE LETTER (OUTPATIENT)
Age: 57
End: 2022-03-05

## 2022-11-20 ENCOUNTER — HEALTH MAINTENANCE LETTER (OUTPATIENT)
Age: 57
End: 2022-11-20

## 2023-01-17 ENCOUNTER — TELEPHONE (OUTPATIENT)
Dept: FAMILY MEDICINE | Facility: CLINIC | Age: 58
End: 2023-01-17
Payer: COMMERCIAL

## 2023-01-17 NOTE — TELEPHONE ENCOUNTER
Patient Quality Outreach    Patient is due for the following:   Colon Cancer Screening  Breast Cancer Screening - Mammogram    Next Steps:   Schedule a office visit for mammogram    Type of outreach:    Sent letter.      Questions for provider review:    None     Kaelyn Tai, CMA

## 2023-04-15 ENCOUNTER — HEALTH MAINTENANCE LETTER (OUTPATIENT)
Age: 58
End: 2023-04-15

## 2024-04-13 ENCOUNTER — HEALTH MAINTENANCE LETTER (OUTPATIENT)
Age: 59
End: 2024-04-13

## 2024-06-22 ENCOUNTER — HEALTH MAINTENANCE LETTER (OUTPATIENT)
Age: 59
End: 2024-06-22

## 2025-08-07 ENCOUNTER — APPOINTMENT (OUTPATIENT)
Dept: CT IMAGING | Facility: CLINIC | Age: 60
End: 2025-08-07
Attending: EMERGENCY MEDICINE
Payer: COMMERCIAL

## 2025-08-07 ENCOUNTER — HOSPITAL ENCOUNTER (EMERGENCY)
Facility: CLINIC | Age: 60
Discharge: HOME | End: 2025-08-07
Attending: EMERGENCY MEDICINE
Payer: COMMERCIAL

## 2025-08-07 VITALS
WEIGHT: 130 LBS | RESPIRATION RATE: 20 BRPM | OXYGEN SATURATION: 97 % | BODY MASS INDEX: 29.15 KG/M2 | TEMPERATURE: 98.2 F | HEART RATE: 71 BPM | DIASTOLIC BLOOD PRESSURE: 72 MMHG | SYSTOLIC BLOOD PRESSURE: 128 MMHG

## 2025-08-07 DIAGNOSIS — R10.9 LEFT FLANK PAIN: ICD-10-CM

## 2025-08-07 DIAGNOSIS — N20.0 KIDNEY STONE: Primary | ICD-10-CM

## 2025-08-07 DIAGNOSIS — R11.2 NAUSEA AND VOMITING, UNSPECIFIED VOMITING TYPE: ICD-10-CM

## 2025-08-07 LAB
ALBUMIN SERPL BCG-MCNC: 4.2 G/DL (ref 3.5–5.2)
ALBUMIN UR-MCNC: NEGATIVE MG/DL
ALP SERPL-CCNC: 118 U/L (ref 40–150)
ALT SERPL W P-5'-P-CCNC: 36 U/L (ref 0–50)
ANION GAP SERPL CALCULATED.3IONS-SCNC: 15 MMOL/L (ref 7–15)
APPEARANCE UR: CLEAR
AST SERPL W P-5'-P-CCNC: 35 U/L (ref 0–45)
BASOPHILS # BLD AUTO: 0 10E3/UL (ref 0–0.2)
BASOPHILS NFR BLD AUTO: 0 %
BILIRUB SERPL-MCNC: 0.6 MG/DL
BILIRUB UR QL STRIP: NEGATIVE
BUN SERPL-MCNC: 13.9 MG/DL (ref 6–20)
CALCIUM SERPL-MCNC: 9.2 MG/DL (ref 8.8–10.4)
CHLORIDE SERPL-SCNC: 100 MMOL/L (ref 98–107)
COLOR UR AUTO: ABNORMAL
CREAT SERPL-MCNC: 0.72 MG/DL (ref 0.51–0.95)
EGFRCR SERPLBLD CKD-EPI 2021: >90 ML/MIN/1.73M2
EOSINOPHIL # BLD AUTO: 0 10E3/UL (ref 0–0.7)
EOSINOPHIL NFR BLD AUTO: 0 %
ERYTHROCYTE [DISTWIDTH] IN BLOOD BY AUTOMATED COUNT: 13.4 % (ref 10–15)
GLUCOSE SERPL-MCNC: 146 MG/DL (ref 70–99)
GLUCOSE UR STRIP-MCNC: NEGATIVE MG/DL
HCO3 SERPL-SCNC: 20 MMOL/L (ref 22–29)
HCT VFR BLD AUTO: 37.7 % (ref 35–47)
HGB BLD-MCNC: 12.5 G/DL (ref 11.7–15.7)
HGB UR QL STRIP: NEGATIVE
IMM GRANULOCYTES # BLD: 0 10E3/UL
IMM GRANULOCYTES NFR BLD: 0 %
KETONES UR STRIP-MCNC: NEGATIVE MG/DL
LEUKOCYTE ESTERASE UR QL STRIP: NEGATIVE
LYMPHOCYTES # BLD AUTO: 0.3 10E3/UL (ref 0.8–5.3)
LYMPHOCYTES NFR BLD AUTO: 4 %
MCH RBC QN AUTO: 29.6 PG (ref 26.5–33)
MCHC RBC AUTO-ENTMCNC: 33.2 G/DL (ref 31.5–36.5)
MCV RBC AUTO: 89 FL (ref 78–100)
MONOCYTES # BLD AUTO: 0.3 10E3/UL (ref 0–1.3)
MONOCYTES NFR BLD AUTO: 4 %
MUCOUS THREADS #/AREA URNS LPF: PRESENT /LPF
NEUTROPHILS # BLD AUTO: 6.7 10E3/UL (ref 1.6–8.3)
NEUTROPHILS NFR BLD AUTO: 91 %
NITRATE UR QL: NEGATIVE
NRBC # BLD AUTO: 0 10E3/UL
NRBC BLD AUTO-RTO: 0 /100
PH UR STRIP: 7 [PH] (ref 5–7)
PLATELET # BLD AUTO: 183 10E3/UL (ref 150–450)
POTASSIUM SERPL-SCNC: 4.1 MMOL/L (ref 3.4–5.3)
PROT SERPL-MCNC: 7.1 G/DL (ref 6.4–8.3)
RBC # BLD AUTO: 4.22 10E6/UL (ref 3.8–5.2)
RBC URINE: 3 /HPF
SODIUM SERPL-SCNC: 135 MMOL/L (ref 135–145)
SP GR UR STRIP: 1.02 (ref 1–1.03)
SQUAMOUS EPITHELIAL: 1 /HPF
UROBILINOGEN UR STRIP-MCNC: NORMAL MG/DL
WBC # BLD AUTO: 7.3 10E3/UL (ref 4–11)
WBC URINE: <1 /HPF

## 2025-08-07 PROCEDURE — 85004 AUTOMATED DIFF WBC COUNT: CPT | Performed by: EMERGENCY MEDICINE

## 2025-08-07 PROCEDURE — 258N000003 HC RX IP 258 OP 636: Performed by: EMERGENCY MEDICINE

## 2025-08-07 PROCEDURE — 99285 EMERGENCY DEPT VISIT HI MDM: CPT | Performed by: EMERGENCY MEDICINE

## 2025-08-07 PROCEDURE — 81003 URINALYSIS AUTO W/O SCOPE: CPT | Performed by: EMERGENCY MEDICINE

## 2025-08-07 PROCEDURE — 36415 COLL VENOUS BLD VENIPUNCTURE: CPT | Performed by: EMERGENCY MEDICINE

## 2025-08-07 PROCEDURE — 74176 CT ABD & PELVIS W/O CONTRAST: CPT

## 2025-08-07 PROCEDURE — 96361 HYDRATE IV INFUSION ADD-ON: CPT

## 2025-08-07 PROCEDURE — 250N000011 HC RX IP 250 OP 636: Performed by: EMERGENCY MEDICINE

## 2025-08-07 PROCEDURE — 96374 THER/PROPH/DIAG INJ IV PUSH: CPT

## 2025-08-07 PROCEDURE — 99285 EMERGENCY DEPT VISIT HI MDM: CPT | Mod: 25 | Performed by: EMERGENCY MEDICINE

## 2025-08-07 PROCEDURE — 84155 ASSAY OF PROTEIN SERUM: CPT | Performed by: EMERGENCY MEDICINE

## 2025-08-07 PROCEDURE — 96375 TX/PRO/DX INJ NEW DRUG ADDON: CPT

## 2025-08-07 RX ORDER — MORPHINE SULFATE 4 MG/ML
4 INJECTION, SOLUTION INTRAMUSCULAR; INTRAVENOUS ONCE
Refills: 0 | Status: COMPLETED | OUTPATIENT
Start: 2025-08-07 | End: 2025-08-07

## 2025-08-07 RX ORDER — ONDANSETRON 2 MG/ML
4 INJECTION INTRAMUSCULAR; INTRAVENOUS EVERY 30 MIN PRN
Status: DISCONTINUED | OUTPATIENT
Start: 2025-08-07 | End: 2025-08-07 | Stop reason: HOSPADM

## 2025-08-07 RX ADMIN — MORPHINE SULFATE 4 MG: 4 INJECTION, SOLUTION INTRAMUSCULAR; INTRAVENOUS at 05:17

## 2025-08-07 RX ADMIN — SODIUM CHLORIDE 1000 ML: 0.9 INJECTION, SOLUTION INTRAVENOUS at 05:36

## 2025-08-07 RX ADMIN — ONDANSETRON 4 MG: 2 INJECTION, SOLUTION INTRAMUSCULAR; INTRAVENOUS at 05:17

## 2025-08-07 ASSESSMENT — ACTIVITIES OF DAILY LIVING (ADL)
ADLS_ACUITY_SCORE: 41
ADLS_ACUITY_SCORE: 41

## 2025-08-07 ASSESSMENT — COLUMBIA-SUICIDE SEVERITY RATING SCALE - C-SSRS
6. HAVE YOU EVER DONE ANYTHING, STARTED TO DO ANYTHING, OR PREPARED TO DO ANYTHING TO END YOUR LIFE?: NO
2. HAVE YOU ACTUALLY HAD ANY THOUGHTS OF KILLING YOURSELF IN THE PAST MONTH?: NO
1. IN THE PAST MONTH, HAVE YOU WISHED YOU WERE DEAD OR WISHED YOU COULD GO TO SLEEP AND NOT WAKE UP?: NO

## 2025-08-23 ENCOUNTER — HEALTH MAINTENANCE LETTER (OUTPATIENT)
Age: 60
End: 2025-08-23